# Patient Record
Sex: FEMALE | Race: WHITE | NOT HISPANIC OR LATINO | Employment: OTHER | URBAN - METROPOLITAN AREA
[De-identification: names, ages, dates, MRNs, and addresses within clinical notes are randomized per-mention and may not be internally consistent; named-entity substitution may affect disease eponyms.]

---

## 2018-10-02 ENCOUNTER — HISTORICAL (OUTPATIENT)
Dept: ANESTHESIOLOGY | Facility: HOSPITAL | Age: 83
End: 2018-10-02

## 2018-12-31 LAB — RAPID GROUP A STREP (OHS): NEGATIVE

## 2019-08-14 LAB
BILIRUB SERPL-MCNC: NEGATIVE MG/DL
BLOOD URINE, POC: NEGATIVE
CLARITY, POC UA: NORMAL
COLOR, POC UA: NORMAL
GLUCOSE UR QL STRIP: NEGATIVE
KETONES UR QL STRIP: NEGATIVE
LEUKOCYTE EST, POC UA: NORMAL
NITRITE, POC UA: POSITIVE
PH, POC UA: 6
PROTEIN, POC: NEGATIVE
SPECIFIC GRAVITY, POC UA: 1.01
UROBILINOGEN, POC UA: NORMAL

## 2019-08-15 ENCOUNTER — HISTORICAL (OUTPATIENT)
Dept: URGENT CARE | Facility: CLINIC | Age: 84
End: 2019-08-15

## 2021-01-11 ENCOUNTER — HISTORICAL (OUTPATIENT)
Dept: ADMINISTRATIVE | Facility: HOSPITAL | Age: 86
End: 2021-01-11

## 2021-05-11 ENCOUNTER — HISTORICAL (OUTPATIENT)
Dept: ADMINISTRATIVE | Facility: HOSPITAL | Age: 86
End: 2021-05-11

## 2021-05-11 LAB
ABS NEUT (OLG): 7.01 X10(3)/MCL (ref 2.1–9.2)
ALBUMIN SERPL-MCNC: 3.1 GM/DL (ref 3.4–4.8)
ALBUMIN/GLOB SERPL: 1.2 RATIO (ref 1.1–2)
ALP SERPL-CCNC: 58 UNIT/L (ref 40–150)
ALT SERPL-CCNC: 12 UNIT/L (ref 0–55)
AST SERPL-CCNC: 14 UNIT/L (ref 5–34)
BASOPHILS # BLD AUTO: 0 X10(3)/MCL (ref 0–0.2)
BASOPHILS NFR BLD AUTO: 0 %
BILIRUB SERPL-MCNC: 0.4 MG/DL
BILIRUBIN DIRECT+TOT PNL SERPL-MCNC: 0.2 MG/DL (ref 0–0.5)
BILIRUBIN DIRECT+TOT PNL SERPL-MCNC: 0.2 MG/DL (ref 0–0.8)
BUN SERPL-MCNC: 17.9 MG/DL (ref 9.8–20.1)
CALCIUM SERPL-MCNC: 9.1 MG/DL (ref 8.4–10.2)
CHLORIDE SERPL-SCNC: 114 MMOL/L (ref 98–111)
CHOLEST SERPL-MCNC: 127 MG/DL
CHOLEST/HDLC SERPL: 3 {RATIO} (ref 0–5)
CO2 SERPL-SCNC: 19 MMOL/L (ref 23–31)
CREAT SERPL-MCNC: 0.83 MG/DL (ref 0.55–1.02)
DEPRECATED CALCIDIOL+CALCIFEROL SERPL-MC: 27.6 NG/ML (ref 30–80)
EOSINOPHIL # BLD AUTO: 0.3 X10(3)/MCL (ref 0–0.9)
EOSINOPHIL NFR BLD AUTO: 3 %
ERYTHROCYTE [DISTWIDTH] IN BLOOD BY AUTOMATED COUNT: 15.1 % (ref 11.5–17)
EST. AVERAGE GLUCOSE BLD GHB EST-MCNC: 99.7 MG/DL
GLOBULIN SER-MCNC: 2.5 GM/DL (ref 2.4–3.5)
GLUCOSE SERPL-MCNC: 144 MG/DL (ref 75–121)
HBA1C MFR BLD: 5.1 %
HCT VFR BLD AUTO: 32.5 % (ref 37–47)
HDLC SERPL-MCNC: 39 MG/DL (ref 35–60)
HGB BLD-MCNC: 9.7 GM/DL (ref 12–16)
LDLC SERPL CALC-MCNC: 75 MG/DL (ref 50–140)
LYMPHOCYTES # BLD AUTO: 1.4 X10(3)/MCL (ref 0.6–4.6)
LYMPHOCYTES NFR BLD AUTO: 15 %
MAGNESIUM SERPL-MCNC: 1.9 MG/DL (ref 1.6–2.6)
MCH RBC QN AUTO: 28.5 PG (ref 27–31)
MCHC RBC AUTO-ENTMCNC: 29.8 GM/DL (ref 33–36)
MCV RBC AUTO: 95.6 FL (ref 80–94)
MONOCYTES # BLD AUTO: 0.7 X10(3)/MCL (ref 0.1–1.3)
MONOCYTES NFR BLD AUTO: 7 %
NEUTROPHILS # BLD AUTO: 7.01 X10(3)/MCL (ref 2.1–9.2)
NEUTROPHILS NFR BLD AUTO: 74 %
PLATELET # BLD AUTO: 300 X10(3)/MCL (ref 130–400)
PMV BLD AUTO: 9.9 FL (ref 9.4–12.4)
POTASSIUM SERPL-SCNC: 4.5 MMOL/L (ref 3.5–5.1)
PREALB SERPL-MCNC: 23.4 MG/DL (ref 14–37)
PROT SERPL-MCNC: 5.6 GM/DL (ref 5.8–7.6)
RBC # BLD AUTO: 3.4 X10(6)/MCL (ref 4.2–5.4)
SODIUM SERPL-SCNC: 144 MMOL/L (ref 132–146)
T4 FREE SERPL-MCNC: 0.96 NG/DL (ref 0.7–1.48)
TRIGL SERPL-MCNC: 63 MG/DL (ref 37–140)
TSH SERPL-ACNC: 2.25 UIU/ML (ref 0.35–4.94)
VLDLC SERPL CALC-MCNC: 13 MG/DL
WBC # SPEC AUTO: 9.5 X10(3)/MCL (ref 4.5–11.5)

## 2022-04-11 ENCOUNTER — HISTORICAL (OUTPATIENT)
Dept: ADMINISTRATIVE | Facility: HOSPITAL | Age: 87
End: 2022-04-11

## 2022-04-29 VITALS
OXYGEN SATURATION: 97 % | SYSTOLIC BLOOD PRESSURE: 140 MMHG | BODY MASS INDEX: 19.42 KG/M2 | HEIGHT: 64 IN | DIASTOLIC BLOOD PRESSURE: 63 MMHG | WEIGHT: 113.75 LBS

## 2022-09-22 ENCOUNTER — HISTORICAL (OUTPATIENT)
Dept: ADMINISTRATIVE | Facility: HOSPITAL | Age: 87
End: 2022-09-22

## 2022-10-03 ENCOUNTER — HOSPITAL ENCOUNTER (INPATIENT)
Facility: HOSPITAL | Age: 87
LOS: 3 days | Discharge: HOME | DRG: 312 | End: 2022-10-06
Attending: EMERGENCY MEDICINE | Admitting: INTERNAL MEDICINE
Payer: MEDICARE

## 2022-10-03 DIAGNOSIS — R07.9 CHEST PAIN: ICD-10-CM

## 2022-10-03 DIAGNOSIS — R53.81 PHYSICAL DECONDITIONING: ICD-10-CM

## 2022-10-03 DIAGNOSIS — R42 DIZZINESS: Primary | ICD-10-CM

## 2022-10-03 DIAGNOSIS — R00.1 BRADYCARDIA: ICD-10-CM

## 2022-10-03 DIAGNOSIS — N39.0 ACUTE UTI: ICD-10-CM

## 2022-10-03 DIAGNOSIS — Z78.9 INABILITY TO PERFORM ACTIVITIES OF DAILY LIVING: ICD-10-CM

## 2022-10-03 DIAGNOSIS — R53.1 WEAKNESS: ICD-10-CM

## 2022-10-03 LAB
ALBUMIN SERPL-MCNC: 3.7 GM/DL (ref 3.4–4.8)
ALBUMIN/GLOB SERPL: 1.1 RATIO (ref 1.1–2)
ALP SERPL-CCNC: 71 UNIT/L (ref 40–150)
ALT SERPL-CCNC: 9 UNIT/L (ref 0–55)
APPEARANCE UR: CLEAR
AST SERPL-CCNC: 13 UNIT/L (ref 5–34)
BACTERIA #/AREA URNS AUTO: ABNORMAL /HPF
BASOPHILS # BLD AUTO: 0.04 X10(3)/MCL (ref 0–0.2)
BASOPHILS NFR BLD AUTO: 0.5 %
BILIRUB UR QL STRIP.AUTO: NEGATIVE MG/DL
BILIRUBIN DIRECT+TOT PNL SERPL-MCNC: 0.3 MG/DL
BUN SERPL-MCNC: 18.7 MG/DL (ref 9.8–20.1)
CALCIUM SERPL-MCNC: 10.4 MG/DL (ref 8.4–10.2)
CHLORIDE SERPL-SCNC: 108 MMOL/L (ref 98–111)
CO2 SERPL-SCNC: 29 MMOL/L (ref 23–31)
COLOR UR AUTO: YELLOW
CREAT SERPL-MCNC: 0.67 MG/DL (ref 0.55–1.02)
EOSINOPHIL # BLD AUTO: 0.27 X10(3)/MCL (ref 0–0.9)
EOSINOPHIL NFR BLD AUTO: 3.4 %
ERYTHROCYTE [DISTWIDTH] IN BLOOD BY AUTOMATED COUNT: 14 % (ref 11.5–17)
GFR SERPLBLD CREATININE-BSD FMLA CKD-EPI: >60 MLS/MIN/1.73/M2
GLOBULIN SER-MCNC: 3.5 GM/DL (ref 2.4–3.5)
GLUCOSE SERPL-MCNC: 109 MG/DL (ref 75–121)
GLUCOSE UR QL STRIP.AUTO: NEGATIVE MG/DL
HCT VFR BLD AUTO: 35.9 % (ref 37–47)
HGB BLD-MCNC: 11.3 GM/DL (ref 12–16)
IMM GRANULOCYTES # BLD AUTO: 0.03 X10(3)/MCL (ref 0–0.04)
IMM GRANULOCYTES NFR BLD AUTO: 0.4 %
KETONES UR QL STRIP.AUTO: NEGATIVE MG/DL
LEUKOCYTE ESTERASE UR QL STRIP.AUTO: NEGATIVE UNIT/L
LYMPHOCYTES # BLD AUTO: 1.68 X10(3)/MCL (ref 0.6–4.6)
LYMPHOCYTES NFR BLD AUTO: 21.3 %
MCH RBC QN AUTO: 28.3 PG (ref 27–31)
MCHC RBC AUTO-ENTMCNC: 31.5 MG/DL (ref 33–36)
MCV RBC AUTO: 90 FL (ref 80–94)
MONOCYTES # BLD AUTO: 0.72 X10(3)/MCL (ref 0.1–1.3)
MONOCYTES NFR BLD AUTO: 9.1 %
NEUTROPHILS # BLD AUTO: 5.1 X10(3)/MCL (ref 2.1–9.2)
NEUTROPHILS NFR BLD AUTO: 65.3 %
NITRITE UR QL STRIP.AUTO: NEGATIVE
NRBC BLD AUTO-RTO: 0 %
PH UR STRIP.AUTO: 7.5 [PH]
PLATELET # BLD AUTO: 315 X10(3)/MCL (ref 130–400)
PMV BLD AUTO: 9 FL (ref 7.4–10.4)
POTASSIUM SERPL-SCNC: 4 MMOL/L (ref 3.5–5.1)
PROT SERPL-MCNC: 7.2 GM/DL (ref 5.8–7.6)
PROT UR QL STRIP.AUTO: NEGATIVE MG/DL
RBC # BLD AUTO: 3.99 X10(6)/MCL (ref 4.2–5.4)
RBC #/AREA URNS AUTO: <5 /HPF
RBC UR QL AUTO: NEGATIVE UNIT/L
SODIUM SERPL-SCNC: 141 MMOL/L (ref 132–146)
SP GR UR STRIP.AUTO: 1.01 (ref 1–1.03)
SQUAMOUS #/AREA URNS AUTO: <5 /HPF
TROPONIN I SERPL-MCNC: 0.01 NG/ML (ref 0–0.04)
UROBILINOGEN UR STRIP-ACNC: 0.2 MG/DL
WBC # SPEC AUTO: 7.9 X10(3)/MCL (ref 4.5–11.5)
WBC #/AREA URNS AUTO: <5 /HPF

## 2022-10-03 PROCEDURE — 21400001 HC TELEMETRY ROOM

## 2022-10-03 PROCEDURE — 93010 EKG 12-LEAD: ICD-10-PCS | Mod: ,,, | Performed by: STUDENT IN AN ORGANIZED HEALTH CARE EDUCATION/TRAINING PROGRAM

## 2022-10-03 PROCEDURE — 85025 COMPLETE CBC W/AUTO DIFF WBC: CPT | Performed by: EMERGENCY MEDICINE

## 2022-10-03 PROCEDURE — 25000003 PHARM REV CODE 250: Performed by: EMERGENCY MEDICINE

## 2022-10-03 PROCEDURE — 93005 ELECTROCARDIOGRAM TRACING: CPT

## 2022-10-03 PROCEDURE — 80053 COMPREHEN METABOLIC PANEL: CPT | Performed by: EMERGENCY MEDICINE

## 2022-10-03 PROCEDURE — 25000003 PHARM REV CODE 250: Performed by: NURSE PRACTITIONER

## 2022-10-03 PROCEDURE — 36415 COLL VENOUS BLD VENIPUNCTURE: CPT | Performed by: EMERGENCY MEDICINE

## 2022-10-03 PROCEDURE — 96374 THER/PROPH/DIAG INJ IV PUSH: CPT

## 2022-10-03 PROCEDURE — 84484 ASSAY OF TROPONIN QUANT: CPT | Performed by: EMERGENCY MEDICINE

## 2022-10-03 PROCEDURE — 93010 ELECTROCARDIOGRAM REPORT: CPT | Mod: 76,,, | Performed by: STUDENT IN AN ORGANIZED HEALTH CARE EDUCATION/TRAINING PROGRAM

## 2022-10-03 PROCEDURE — 81001 URINALYSIS AUTO W/SCOPE: CPT | Performed by: EMERGENCY MEDICINE

## 2022-10-03 PROCEDURE — 99285 EMERGENCY DEPT VISIT HI MDM: CPT | Mod: 25

## 2022-10-03 PROCEDURE — 63600175 PHARM REV CODE 636 W HCPCS: Performed by: EMERGENCY MEDICINE

## 2022-10-03 PROCEDURE — 93010 ELECTROCARDIOGRAM REPORT: CPT | Mod: ,,, | Performed by: STUDENT IN AN ORGANIZED HEALTH CARE EDUCATION/TRAINING PROGRAM

## 2022-10-03 RX ORDER — ACETAMINOPHEN 325 MG/1
650 TABLET ORAL EVERY 4 HOURS PRN
Status: DISCONTINUED | OUTPATIENT
Start: 2022-10-03 | End: 2022-10-06 | Stop reason: HOSPADM

## 2022-10-03 RX ORDER — RAMIPRIL 10 MG/1
10 CAPSULE ORAL 2 TIMES DAILY
Status: ON HOLD | COMMUNITY
Start: 2022-07-15 | End: 2022-10-06 | Stop reason: HOSPADM

## 2022-10-03 RX ORDER — SODIUM CHLORIDE 9 MG/ML
INJECTION, SOLUTION INTRAVENOUS CONTINUOUS
Status: DISCONTINUED | OUTPATIENT
Start: 2022-10-03 | End: 2022-10-06 | Stop reason: HOSPADM

## 2022-10-03 RX ORDER — BISACODYL 10 MG
10 SUPPOSITORY, RECTAL RECTAL DAILY PRN
Status: DISCONTINUED | OUTPATIENT
Start: 2022-10-03 | End: 2022-10-06 | Stop reason: HOSPADM

## 2022-10-03 RX ORDER — SODIUM CHLORIDE 0.9 % (FLUSH) 0.9 %
10 SYRINGE (ML) INJECTION
Status: DISCONTINUED | OUTPATIENT
Start: 2022-10-03 | End: 2022-10-06 | Stop reason: HOSPADM

## 2022-10-03 RX ORDER — LEVOTHYROXINE SODIUM 25 UG/1
25 TABLET ORAL DAILY
Status: DISCONTINUED | OUTPATIENT
Start: 2022-10-04 | End: 2022-10-06 | Stop reason: HOSPADM

## 2022-10-03 RX ORDER — METHENAMINE HIPPURATE 1000 MG/1
1 TABLET ORAL 2 TIMES DAILY
Status: ON HOLD | COMMUNITY
Start: 2022-07-29 | End: 2022-10-06 | Stop reason: HOSPADM

## 2022-10-03 RX ORDER — ENOXAPARIN SODIUM 100 MG/ML
40 INJECTION SUBCUTANEOUS EVERY 24 HOURS
Status: DISCONTINUED | OUTPATIENT
Start: 2022-10-04 | End: 2022-10-06 | Stop reason: HOSPADM

## 2022-10-03 RX ORDER — ONDANSETRON 2 MG/ML
4 INJECTION INTRAMUSCULAR; INTRAVENOUS EVERY 4 HOURS PRN
Status: DISCONTINUED | OUTPATIENT
Start: 2022-10-03 | End: 2022-10-06 | Stop reason: HOSPADM

## 2022-10-03 RX ORDER — TAMSULOSIN HYDROCHLORIDE 0.4 MG/1
1 CAPSULE ORAL DAILY
Status: ON HOLD | COMMUNITY
Start: 2022-09-28 | End: 2022-10-06 | Stop reason: HOSPADM

## 2022-10-03 RX ORDER — MECLIZINE HYDROCHLORIDE 25 MG/1
25 TABLET ORAL
Status: COMPLETED | OUTPATIENT
Start: 2022-10-03 | End: 2022-10-03

## 2022-10-03 RX ORDER — ALLOPURINOL 100 MG/1
100 TABLET ORAL DAILY
Status: ON HOLD | COMMUNITY
Start: 2022-06-24 | End: 2022-10-20 | Stop reason: SDUPTHER

## 2022-10-03 RX ORDER — AMLODIPINE BESYLATE 10 MG/1
10 TABLET ORAL DAILY
Status: ON HOLD | COMMUNITY
Start: 2022-07-29 | End: 2022-10-06 | Stop reason: HOSPADM

## 2022-10-03 RX ORDER — PROCHLORPERAZINE EDISYLATE 5 MG/ML
5 INJECTION INTRAMUSCULAR; INTRAVENOUS EVERY 6 HOURS PRN
Status: DISCONTINUED | OUTPATIENT
Start: 2022-10-03 | End: 2022-10-06 | Stop reason: HOSPADM

## 2022-10-03 RX ORDER — AMOXICILLIN 250 MG
1 CAPSULE ORAL 2 TIMES DAILY PRN
Status: DISCONTINUED | OUTPATIENT
Start: 2022-10-03 | End: 2022-10-06 | Stop reason: HOSPADM

## 2022-10-03 RX ORDER — TAMSULOSIN HYDROCHLORIDE 0.4 MG/1
1 CAPSULE ORAL DAILY
Status: DISCONTINUED | OUTPATIENT
Start: 2022-10-04 | End: 2022-10-06 | Stop reason: HOSPADM

## 2022-10-03 RX ORDER — METOPROLOL TARTRATE 25 MG/1
25 TABLET, FILM COATED ORAL 2 TIMES DAILY
Status: ON HOLD | COMMUNITY
Start: 2022-07-15 | End: 2022-10-06 | Stop reason: HOSPADM

## 2022-10-03 RX ORDER — PRAVASTATIN SODIUM 40 MG/1
40 TABLET ORAL DAILY
Status: ON HOLD | COMMUNITY
Start: 2022-09-22 | End: 2022-10-20 | Stop reason: SDUPTHER

## 2022-10-03 RX ORDER — MECLIZINE HYDROCHLORIDE 25 MG/1
25 TABLET ORAL 3 TIMES DAILY PRN
Status: DISCONTINUED | OUTPATIENT
Start: 2022-10-03 | End: 2022-10-06 | Stop reason: HOSPADM

## 2022-10-03 RX ORDER — HYDRALAZINE HYDROCHLORIDE 25 MG/1
25 TABLET, FILM COATED ORAL 2 TIMES DAILY
Status: ON HOLD | COMMUNITY
Start: 2022-08-16 | End: 2022-10-06 | Stop reason: HOSPADM

## 2022-10-03 RX ORDER — AMLODIPINE BESYLATE 5 MG/1
10 TABLET ORAL DAILY
Status: DISCONTINUED | OUTPATIENT
Start: 2022-10-04 | End: 2022-10-06 | Stop reason: HOSPADM

## 2022-10-03 RX ORDER — LEVOTHYROXINE SODIUM 25 UG/1
25 TABLET ORAL DAILY
Status: ON HOLD | COMMUNITY
Start: 2022-07-15 | End: 2022-10-20 | Stop reason: HOSPADM

## 2022-10-03 RX ORDER — PRAVASTATIN SODIUM 40 MG/1
40 TABLET ORAL DAILY
Status: DISCONTINUED | OUTPATIENT
Start: 2022-10-04 | End: 2022-10-06 | Stop reason: HOSPADM

## 2022-10-03 RX ORDER — ENOXAPARIN SODIUM 100 MG/ML
40 INJECTION SUBCUTANEOUS EVERY 24 HOURS
Status: DISCONTINUED | OUTPATIENT
Start: 2022-10-03 | End: 2022-10-03

## 2022-10-03 RX ORDER — MAG HYDROX/ALUMINUM HYD/SIMETH 200-200-20
30 SUSPENSION, ORAL (FINAL DOSE FORM) ORAL EVERY 4 HOURS PRN
Status: DISCONTINUED | OUTPATIENT
Start: 2022-10-03 | End: 2022-10-06 | Stop reason: HOSPADM

## 2022-10-03 RX ORDER — POLYETHYLENE GLYCOL 3350 17 G/17G
17 POWDER, FOR SOLUTION ORAL 2 TIMES DAILY PRN
Status: DISCONTINUED | OUTPATIENT
Start: 2022-10-03 | End: 2022-10-06 | Stop reason: HOSPADM

## 2022-10-03 RX ADMIN — CEFTRIAXONE SODIUM 1 G: 1 INJECTION, POWDER, FOR SOLUTION INTRAMUSCULAR; INTRAVENOUS at 05:10

## 2022-10-03 RX ADMIN — SODIUM CHLORIDE: 9 INJECTION, SOLUTION INTRAVENOUS at 11:10

## 2022-10-03 RX ADMIN — MECLIZINE HYDROCHLORIDE 25 MG: 25 TABLET ORAL at 02:10

## 2022-10-03 NOTE — ED PROVIDER NOTES
Encounter Date: 10/3/2022       History     Chief Complaint   Patient presents with    Weakness     Patient reports weakness since last night, denies chest pain or SOB, also reports frequent urination     89-year-old female presenting with dizziness since last night when she woke up to go to the bathroom.  States that for the past 2 weeks she has been feeling dizziness mostly at nighttime.  But today it worsened.  Relative at bedside reports that she was so dizzy she almost fell today.  Patient reports that she feels like she is spinning and off-balance.    The history is provided by the patient and a relative. No  was used.   Neurologic Problem  The primary symptoms include dizziness. Primary symptoms do not include seizures, fever, nausea or vomiting. The symptoms began yesterday. The symptoms are unchanged.   She describes the dizziness as sensation of spinning and imbalance. Dizziness also occurs with weakness. Dizziness does not occur with nausea or vomiting.   Additional symptoms include weakness.   Review of patient's allergies indicates:  No Known Allergies  Past Medical History:   Diagnosis Date    Coronary artery disease     Gout, unspecified     Hypertension     Thyroid disease      Past Surgical History:   Procedure Laterality Date    CORONARY ANGIOPLASTY WITH STENT PLACEMENT       History reviewed. No pertinent family history.  Social History     Tobacco Use    Smoking status: Never    Smokeless tobacco: Never   Substance Use Topics    Alcohol use: Never     Review of Systems   Constitutional:  Negative for chills and fever.   HENT:  Negative for congestion and sore throat.    Eyes:  Negative for pain and visual disturbance.   Respiratory:  Negative for cough and shortness of breath.    Cardiovascular:  Negative for chest pain and leg swelling.   Gastrointestinal:  Negative for abdominal pain, diarrhea, nausea and vomiting.   Genitourinary:  Negative for dysuria and menstrual  problem.   Skin:  Negative for rash and wound.   Allergic/Immunologic: Negative for food allergies and immunocompromised state.   Neurological:  Positive for dizziness and weakness. Negative for seizures and syncope.     Physical Exam     Initial Vitals [10/03/22 1332]   BP Pulse Resp Temp SpO2   (!) 144/50 76 16 97.9 °F (36.6 °C) 97 %      MAP       --         Physical Exam    Nursing note and vitals reviewed.  Constitutional: She appears well-developed and well-nourished. She is not diaphoretic. She does not appear ill. No distress.   Hard of hearing, thin elderly white female    HENT:   Head: Normocephalic and atraumatic.   Right Ear: External ear normal.   Left Ear: External ear normal.   Mouth/Throat: Oropharynx is clear and moist.   Eyes: Conjunctivae and EOM are normal. Pupils are equal, round, and reactive to light.   Neck: Neck supple. No tracheal deviation present.   Cardiovascular:  Normal rate, regular rhythm, normal heart sounds and intact distal pulses.           No murmur heard.  Pulmonary/Chest: Breath sounds normal. No respiratory distress. She has no wheezes. She has no rhonchi. She has no rales.   Abdominal: Abdomen is soft. Bowel sounds are normal. She exhibits no distension. There is no abdominal tenderness.   No right CVA tenderness.  No left CVA tenderness.   Musculoskeletal:         General: Normal range of motion.      Cervical back: Neck supple.     Neurological: She is alert and oriented to person, place, and time. She has normal strength. No cranial nerve deficit or sensory deficit. GCS score is 15. GCS eye subscore is 4. GCS verbal subscore is 5. GCS motor subscore is 6.   Dysmetria with finger to nose on right    Skin: Skin is warm and dry. Capillary refill takes less than 2 seconds. No rash noted. No pallor.   Psychiatric: She has a normal mood and affect. Her behavior is normal.       ED Course   Procedures  Labs Reviewed   COMPREHENSIVE METABOLIC PANEL - Abnormal; Notable for the  following components:       Result Value    Calcium Level Total 10.4 (*)     All other components within normal limits   CBC WITH DIFFERENTIAL - Abnormal; Notable for the following components:    RBC 3.99 (*)     Hgb 11.3 (*)     Hct 35.9 (*)     MCHC 31.5 (*)     All other components within normal limits   URINALYSIS, MICROSCOPIC - Abnormal; Notable for the following components:    Bacteria, UA 1+ (*)     All other components within normal limits   TROPONIN I - Normal   URINALYSIS, REFLEX TO URINE CULTURE - Normal   CBC W/ AUTO DIFFERENTIAL    Narrative:     The following orders were created for panel order CBC auto differential.  Procedure                               Abnormality         Status                     ---------                               -----------         ------                     CBC with Differential[[id number]]        Abnormal            Final result                 Please view results for these tests on the individual orders.     EKG Readings: (Independently Interpreted)   Initial Reading: No STEMI. Rhythm: Normal Sinus Rhythm. Heart Rate: 64. Axis: Left Axis Deviation. Clinical Impression: Left Ventricular Hypertrophy (LDH)   Performed at 1418   ECG Results              EKG 12-lead (Final result)  Result time 10/04/22 04:49:41      Final result by Interface, Lab In [facility] (10/04/22 04:49:41)                   Narrative:    Test Reason : R00.1,    Vent. Rate : 070 BPM     Atrial Rate : 070 BPM     P-R Int : 162 ms          QRS Dur : 130 ms      QT Int : 456 ms       P-R-T Axes : 049 -58 070 degrees     QTc Int : 492 ms    Sinus rhythm with occasional Premature ventricular complexes  Left axis deviation  Left bundle branch block  Abnormal ECG  Confirmed by Ham Arthur MD ([id number]) on 10/4/2022 4:49:34 AM    Referred By: [email or web address]   SELF           Confirmed By:Ham Arthur MD                                     EKG 12-lead (Final result)  Result time 10/04/22 05:14:43      Final result by  Interface, Lab In [facility] (10/04/22 05:14:43)                   Narrative:    Test Reason : R53.1,    Vent. Rate : 064 BPM     Atrial Rate : 064 BPM     P-R Int : 156 ms          QRS Dur : 132 ms      QT Int : 470 ms       P-R-T Axes : 075 -44 048 degrees     QTc Int : 484 ms    Normal sinus rhythm  Left axis deviation  Nonspecific intraventricular block  Minimal voltage criteria for LVH, may be normal variant ( Osiel product )  Abnormal ECG  No previous ECGs available  Confirmed by Ham Arthur MD ([id number]) on 10/4/2022 5:14:30 AM    Referred By:             Confirmed By:Ham Arthur MD                                  Imaging Results              CT Head Without Contrast (Final result)  Result time 10/03/22 15:10:11      Final result by Jaydon Morrow MD (10/03/22 15:10:11)                   Impression:      Chronic age-related changes.  No acute process      Electronically signed by: Jaydon Morrow  Date:    10/03/2022  Time:    15:10               Narrative:    EXAMINATION:  CT HEAD WITHOUT CONTRAST    CLINICAL HISTORY:  Dizziness, persistent/recurrent, cardiac or vascular cause suspected;    TECHNIQUE:  Multiple axial images were obtained from the base of the brain to the vertex without contrast administration.  Sagittal and coronal reconstructions were performed..Automatic exposure control is utilized to reduce patient radiation exposure.    COMPARISON:  None    FINDINGS:  There is no intracranial mass or lesion seen.  No hemorrhage is seen.  No acute infarct is seen.  There is some cerebral atrophy seen.  There is some compensatory ventricular dilatation and periventricular white matter changes consistent with the patient's age.  Calvarium is intact.  The posterior fossa is intact.  The visualized portions of the paranasal sinuses show no acute abnormality.                                    X-Rays:   Independently Interpreted Readings:   Head CT: No hemorrhage.   Medications   meclizine tablet  25 mg (25 mg Oral Given 10/3/22 1415)   cefTRIAXone (ROCEPHIN) 1 g in dextrose 5 % in water (D5W) 5 % 50 mL IVPB (MB+) (0 g Intravenous Stopped 10/3/22 1730)     Medical Decision Making:   Initial Assessment:   89 yo female well appearing, mild dysmetria with finger to nose on right   Clinical Tests:   Lab Tests: Ordered and Reviewed  The following lab test(s) were unremarkable: Troponin       <> Summary of Lab: Bacteria in urine, hypercalcemia, anemia   Radiological Study: Ordered and Reviewed  Medical Tests: Ordered and Reviewed  ED Management:  Given Meclizine for dizziness  Labs with mild UTI- given Rocephin  CT head with age related changes  Admitted to hospitalist for further stroke workup and PT eval   Other:   I have discussed this case with another health care provider.           ED Course as of 10/04/22 1007   Mon Oct 03, 2022   1646 Discussed results with patient and son.  Will admit due to deconditioning and fall risk [KM]   1648 Paged hospitalist [MM]      ED Course User Index  [KM] Tami Marin MD  [MM] Deirdre Madrid                 Clinical Impression:   Final diagnoses:  [R53.1] Weakness  [R42] Dizziness (Primary)  [N39.0] Acute UTI  [R53.81] Physical deconditioning  [Z78.9] Inability to perform activities of daily living        ED Disposition Condition    Admit Stable                Tami Marin MD  10/04/22 1011

## 2022-10-04 PROCEDURE — 97166 OT EVAL MOD COMPLEX 45 MIN: CPT

## 2022-10-04 PROCEDURE — 63600175 PHARM REV CODE 636 W HCPCS: Performed by: NURSE PRACTITIONER

## 2022-10-04 PROCEDURE — 63600175 PHARM REV CODE 636 W HCPCS: Performed by: STUDENT IN AN ORGANIZED HEALTH CARE EDUCATION/TRAINING PROGRAM

## 2022-10-04 PROCEDURE — 21400001 HC TELEMETRY ROOM

## 2022-10-04 PROCEDURE — 97162 PT EVAL MOD COMPLEX 30 MIN: CPT

## 2022-10-04 PROCEDURE — 25000003 PHARM REV CODE 250: Performed by: NURSE PRACTITIONER

## 2022-10-04 PROCEDURE — 25000003 PHARM REV CODE 250: Performed by: STUDENT IN AN ORGANIZED HEALTH CARE EDUCATION/TRAINING PROGRAM

## 2022-10-04 RX ADMIN — AMLODIPINE BESYLATE 10 MG: 5 TABLET ORAL at 09:10

## 2022-10-04 RX ADMIN — CEFTRIAXONE SODIUM 1 G: 1 INJECTION, POWDER, FOR SOLUTION INTRAMUSCULAR; INTRAVENOUS at 02:10

## 2022-10-04 RX ADMIN — LEVOTHYROXINE SODIUM 25 MCG: 25 TABLET ORAL at 09:10

## 2022-10-04 RX ADMIN — TAMSULOSIN HYDROCHLORIDE 0.4 MG: 0.4 CAPSULE ORAL at 09:10

## 2022-10-04 RX ADMIN — ENOXAPARIN SODIUM 40 MG: 40 INJECTION SUBCUTANEOUS at 05:10

## 2022-10-04 RX ADMIN — PRAVASTATIN SODIUM 40 MG: 40 TABLET ORAL at 09:10

## 2022-10-04 NOTE — PT/OT/SLP EVAL
Physical Therapy Evaluation    Patient Name:  Heidy Gay   MRN:  [id number]    Recommendations:     Discharge Recommendations:   (pending progress and medical status)   Discharge Equipment Recommendations: walker, rolling   Barriers to discharge:  acuity of illness    Assessment:     Heidy Gay is a 89 y.o. female admitted with a medical diagnosis of Dizziness.  She presents with the following impairments/functional limitations:  weakness, impaired endurance, impaired self care skills, impaired functional mobility, gait instability.    Rehab Prognosis: Good; patient would benefit from acute skilled PT services to address these deficits and reach maximum level of function.    Recent Surgery: * No surgery found *      Plan:     During this hospitalization, patient to be seen daily to address the identified rehab impairments via gait training, therapeutic activities, therapeutic exercises, neuromuscular re-education and progress toward the following goals:    Plan of Care Expires:  22    Subjective     Chief Complaint: dizziness when sitting up and standing  Patient/Family Comments/goals: to be independent  Pain/Comfort:  Pain Rating 1: 0/10    Patients cultural, spiritual, [employer] conflicts given the current situation: no    Living Environment:  Pt lives alone in a SLH with no steps to enter.   Prior to admission, patients level of function was independent.  Equipment used at home: walker, rolling.  DME owned (not currently used): single point cane.  Upon discharge, patient will have assistance from family.    Objective:     Communicated with RN prior to session.  Patient found HOB elevated with peripheral IV, PureWick  upon PT entry to room.    General Precautions: Standard, fall   Orthopedic Precautions:    Braces:    Respiratory Status: Room air    BP in supine: 144/66 mmHg  BP sitting EOB: 117/64 mmHg  BP standin/52 mmHg    Exams:  Cognitive Exam:  Patient is oriented to Person, Place,  Time, and Situation  RLE ROM: WFL  RLE Strength: WFL  LLE ROM: WFL  LLE Strength: WFL    Functional Mobility:  Bed Mobility:     Supine to Sit: supervision  Sit to Supine: supervision  Transfers:     Sit to Stand:  contact guard assistance with rolling walker  Gait: pt took two steps with RW and Tong. Pt c/o dizziness so no further OOB mobility was attempted and vitals were taken (see above).       AM-PAC 6 CLICK MOBILITY  Total Score:19     Patient left HOB elevated with all lines intact, call button in reach, and RN notified.    GOALS:   Multidisciplinary Problems       Physical Therapy Goals          Problem: Physical Therapy    Goal Priority Disciplines Outcome Goal Variances Interventions   Physical Therapy Goal     PT, PT/OT      Description: Goals to be met by: 11/3/2022     Patient will increase functional independence with mobility by performin. Supine to sit with [place]  2. Sit to supine with [place]  3. Sit to stand transfer with Modified [place]  4. Gait  x 500 feet with Modified [place] using Rolling Walker vs LRAD.                          History:     Past Medical History:   Diagnosis Date    Coronary artery disease     Gout, unspecified     Hypertension     Thyroid disease        Past Surgical History:   Procedure Laterality Date    CORONARY ANGIOPLASTY WITH STENT PLACEMENT         Time Tracking:     PT Received On: 10/04/22  PT Start Time: 1319     PT Stop Time: 1336  PT Total Time (min): 17 min     Billable Minutes: Evaluation , moderate complexity       10/04/2022

## 2022-10-04 NOTE — PLAN OF CARE
Problem: Occupational Therapy  Goal: Occupational Therapy Goal  Description: STG: to be met in 2 weeks  1. UB dressing independently.  2. LB dressing with RW Mod I.  3. Toileting, toilet t/f, functional mobility to toilet with RW mod I.  4. Grooming standing at sink with RW mod I.  5. Shower t/f mod I with RW and SC.    Outcome: Ongoing, Progressing

## 2022-10-04 NOTE — PLAN OF CARE
Goals to be met by: 11/3/2022     Patient will increase functional independence with mobility by performin. Supine to sit with [place]  2. Sit to supine with Will  3. Sit to stand transfer with Modified [place]  4. Gait  x 500 feet with Modified [place] using Rolling Walker vs LRAD.

## 2022-10-04 NOTE — PROGRESS NOTES
"[facility]  Hospital Medicine Progress Note        Chief Complaint: seeing black spots on walls.    HPI:   89 yo female with pmhx HTN, CAD/stent, hypothyroidism.    presents to the ED with c/o seeing "black spots" on her bedroom wall at night for the past two weeks. She states that she goes to bed every night at 7:00 but reads before she falls asleep.  She states that when she was reading in bed when she would look up she would see a floating black spot on the wall in front of her.  She is unsure how long it last but states eventually I would  fall asleep and when I would wake up it was not longer there. However, for the past 2 nights she noticed the spots around 7:00 p.m. and then again around midnight when she would wake up and go to the bathroom, this time she reports feeling off balance" when she went to the bathroom.  She did not fall, no head trauma or loss of consciousness.  She denies nausea, vomiting, headache ,fever, chills, blurred vision, unilateral weakness, history of TIA or CVA, medication changes. She does not take a sleep aid.  The symptoms only occur at nighttime.        Initial ED VS:  Temperature 97.9°, HR 76, RR 18, /50.  EKG NSR with nonspecific intraventricular block Labs remarkable for hemoglobin 11.3, hematocrit 35.9, calcium 10.4, UA 1+ bacteria.  Patient without urinary complaints.  She does report for the past 3 years she has to go to the bathroom every 2 hours and this is nothing new.  CT of her head with age related changes.  No acute findings.  She is being admitted to the hospitalist service for further management     Interval Hx:     Patient wants complains burning with urination and significantly foul smell, present in the room during my evaluation.  She is no longer seeing black spots  Objective/physical exam:    Neuro:  Alert awake oriented, comprehension intact, comprehension intact.  She is hard of hearing.    General: Appears comfortable, no " acute distress.  Integumentary: Warm, dry, intact.  Musculoskeletal: Purposeful movement noted.   Respiratory: No accessory muscle use. Breath sounds are equal.  Cardiovascular: Regular rate. No peripheral edema.    VITAL SIGNS: 24 HRS MIN & MAX LAST   Temp  Min: 97.4 °F (36.3 °C)  Max: 98.1 °F (36.7 °C) 97.4 °F (36.3 °C)   BP  Min: 115/62  Max: 144/50 120/66   Pulse  Min: 54  Max: 89  89   Resp  Min: 16  Max: 22 (!) 22     SpO2  Min: 94 %  Max: 98 % 96 %     CT Head Without Contrast  Narrative: EXAMINATION:  CT HEAD WITHOUT CONTRAST    CLINICAL HISTORY:  Dizziness, persistent/recurrent, cardiac or vascular cause suspected;    TECHNIQUE:  Multiple axial images were obtained from the base of the brain to the vertex without contrast administration.  Sagittal and coronal reconstructions were performed..Automatic exposure control is utilized to reduce patient radiation exposure.    COMPARISON:  None    FINDINGS:  There is no intracranial mass or lesion seen.  No hemorrhage is seen.  No acute infarct is seen.  There is some cerebral atrophy seen.  There is some compensatory ventricular dilatation and periventricular white matter changes consistent with the patient's age.  Calvarium is intact.  The posterior fossa is intact.  The visualized portions of the paranasal sinuses show no acute abnormality.  Impression: Chronic age-related changes.  No acute process    Electronically signed by: Jaydon Morrow  Date:    10/03/2022  Time:    15:10    Recent Labs   Lab 10/03/22  1355   WBC 7.9   RBC 3.99*   HGB 11.3*   HCT 35.9*   MCV 90.0   MCH 28.3   MCHC 31.5*   RDW 14.0      MPV 9.0       Recent Labs   Lab 10/03/22  1355      K 4.0   CO2 29   BUN 18.7   CREATININE 0.67   CALCIUM 10.4*   ALBUMIN 3.7   ALKPHOS 71   ALT 9   AST 13   BILITOT 0.3          Microbiology Results (last 7 days)       ** No results found for the last 168 hours. **             See below for Radiology    Scheduled Med:   amLODIPine  10 mg  Oral Daily    enoxaparin  40 mg Subcutaneous Daily    levothyroxine  25 mcg Oral Daily    pravastatin  40 mg Oral Daily    tamsulosin  1 capsule Oral Daily        Continuous Infusions:   sodium chloride 0.9% 75 mL/hr at 10/03/22 8977        PRN Meds:  acetaminophen, aluminum-magnesium hydroxide-simethicone, bisacodyL, meclizine, ondansetron, polyethylene glycol, prochlorperazine, senna-docusate 8.6-50 mg, sodium chloride 0.9%, trazodone     Nutrition Status:      Assessment/Plan:  Visual Hallucination   Gait Ataxia  Bradycardia  Asymptomatic Bacteruria  Essential hypertension  Hyperlipidemia  CAD -remote history stent placement  Hypothyroidism       Patient presents for visual hallucinations, neurology has been consulted, CT of the head is unremarkable.  MRI of the brain is pending.    Labs are significantly unremarkable.    Patient was symptomatic urinary complaints, will add Rocephin monitor clinically for improvement and as such time transition to oral antibiotic for total 3-5 days  Agree with melatonin for insomnia.    Continue telemetry.    Consult PT/OT.      Anticipated discharge and Disposition:  Pending    All diagnosis and differential diagnosis have been reviewed; assessment and plan has been documented; I have personally reviewed the labs and test results that are presently available; I have reviewed the patients medication list; I have reviewed the consulting providers response and recommendations. I have reviewed or attempted to review medical records based upon their availability    All of the patient's questions have been  addressed and answered. Patient's is agreeable to the above stated plan.   I will continue to monitor closely and make adjustments to medical management as needed.          Glo Meneses,    10/04/2022        This note was created with the assistance of Dragon voice recognition software. There may be transcription errors as a result of using this technology however minimal.  Effort has been made to assure accuracy of transcription but any obvious errors or omissions should be clarified with the author of the document.

## 2022-10-04 NOTE — PT/OT/SLP EVAL
Occupational Therapy   Evaluation    Name: Heiyd Gay  MRN: [id number]  Admitting Diagnosis:  Dizziness  Recent Surgery: * No surgery found *      Recommendations:     Discharge Recommendations: rehabilitation facility (or home with HH if family is able to stay with her for 1-2 weeks)  Discharge Equipment Recommendations:  walker, rolling  Barriers to discharge:   (lives alone; fall risk)    Assessment:     Heidy Gay is a 89 y.o. female with a medical diagnosis of Dizziness, ataxia, floaters in vision at night.  She presents with generalized deconditioning and instability during ADL/mobility resulting in increased fall risk. Performance deficits affecting function: weakness, impaired endurance, impaired self care skills, impaired functional mobility, impaired balance.  She would benefit from short rehab stay vs home with HH and 1-2 weeks of close family supervision on d/c for fall prevention.    Rehab Prognosis: Good; patient would benefit from acute skilled OT services to address these deficits and reach maximum level of function.       Plan:     Patient to be seen 5 x/week to address the above listed problems via self-care/home management, therapeutic activities, therapeutic exercises  Plan of Care Expires: 10/18/22  Plan of Care Reviewed with: patient    Subjective     Chief Complaint: none  Patient/Family Comments/goals: wants to go home    Occupational Profile:  Living Environment: pt lives alone (states that her sons live nearby) in a 2 story home with 1 step to enter, bedroom/bath on first floor, walk in shower.  Previous level of function: independent  Roles and Routines: performs own ADL, has assist with strenuous IADL. son assists with medication management  Equipment Used at Home:  shower chair (pt uses a cane for outdoor mobility; has a rollator but does not use it)  Assistance upon Discharge: sons nearby to check on occasionally     Pain/Comfort:  Pain Rating 1: 0/10    Patients cultural,  spiritual, Congregational conflicts given the current situation: no    Objective:     Communicated with: CM upon conclusion.  Patient found HOB elevated with PureWick, telemetry upon OT entry to room.    General Precautions: Standard, fall   Orthopedic Precautions:N/A   Braces: N/A  Respiratory Status: Room air    Occupational Performance:    Bed Mobility:    Patient completed Supine to Sit with contact guard assistance  Patient completed Sit to Supine with contact guard assistance    Functional Mobility/Transfers:  Patient completed Sit <> Stand Transfer with contact guard assistance  with  hand-held assist   Patient completed Toilet Transfer Step Transfer technique with minimum assistance with  hand-held assist  Functional Mobility: CGA with RW    Activities of Daily Living:  Upper Body Dressing: minimum assistance .  Lower Body Dressing: minimum assistance .    Cognitive/Visual Perceptual:  Cognitive/Psychosocial Skills:     -       Oriented to: Person and Place   -       Follows Commands/attention:Follows one-step commands  -       Mood/Affect/Coping skills/emotional control: Cooperative  Visual/Perceptual:      -appears WFL;  pt reports that floaters in vision have resolved    Physical Exam:  Sit balance: SBA  Stand balance: CGA    BUE: AROM WFL, strength 4/5    AMPAC 6 Click ADL:  AMPAC Total Score: 20    Treatment & Education:  Initial eval performed. Pt educated on OT role/goals/POC/discharge recs.    Patient left HOB elevated with all lines intact    GOALS:   Multidisciplinary Problems       Occupational Therapy Goals          Problem: Occupational Therapy    Goal Priority Disciplines Outcome Interventions   Occupational Therapy Goal     OT, PT/OT Ongoing, Progressing    Description: STG: to be met in 2 weeks  1. UB dressing independently.  2. LB dressing with RW Mod I.  3. Toileting, toilet t/f, functional mobility to toilet with RW mod I.  4. Grooming standing at sink with RW mod I.  5. Shower t/f mod I with  RW and SC.                         History:     Past Medical History:   Diagnosis Date    Coronary artery disease     Gout, unspecified     Hypertension     Thyroid disease          Past Surgical History:   Procedure Laterality Date    CORONARY ANGIOPLASTY WITH STENT PLACEMENT         Time Tracking:     OT Date of Treatment: 10/04/22  OT Start Time: 1112  OT Stop Time: 1125  OT Total Time (min): 13 min    Billable Minutes:Evaluation moderate    10/4/2022

## 2022-10-04 NOTE — H&P
"[facility] Hospital Medicine   History & Physical Note      Patient Name: Heidy Gay  : 1929  MRN: [id number]  PCP: Jay Arroyo MD  Admitting Service: Hospital Medicine  Attending Physician: Becki Palma MD  Admission Date: 10/3/2022 - IP- Inpatient   Length of Stay: 0  History source: EMR, patient and/or patient's family  Face-to-Face encounter date: 10/03/2022  Code status: Full    Chief Complaint   "Seeing black spots on my walls"      History of Present Illness   Mrs. Gay is a 89 yo female with pmhx HTN, CAD/stent, hypothyroidism who presents to the ED with c/o seeing "black spots" on her bedroom wall at night for the past two weeks. She states that she goes to bed every night at 7:00 a.m. but reads before she falls asleep.  She states that when she was reading in bed when she would look up she would see a floating black spot on the wall in front of her.  She is unsure how long it lets but states eventually I would close fall asleep and when I woud wake up it was not longer there. However, for the past 2 nights she noticed the spots around 7:00 p.m. and then again around midnight when she would wake up and go to the bathroom, this time she reports feeling off balance" when she went to the bathroom.  She did not fall, no head trauma or loss of consciousness.  She denies nausea, vomiting, headache ,fever, chills, blurred vision, unilateral weakness, history of TIA or CVA, medication changes. She does not take a sleep aid.  The symptoms only occur at nighttime.      Initial ED VS:  Temperature 97.9°, HR 76, RR 18, /50.  EKG NSR with nonspecific intraventricular block Labs remarkable for hemoglobin 11.3, hematocrit 35.9, calcium 10.4, UA 1+ bacteria.  Patient without urinary complaints.  She does report for the past 3 years she has to go to the bathroom every 2 hours and this is nothing new.  CT of her head with age related changes.  No acute findings.  She " is being admitted to the hospitalist service for further management      ROS   Except as documented, all other systems reviewed and negative     Past Medical History   Essential hypertension   Hyperlipidemia   CAD  Hypothyroidism  Past Surgical History     Past Surgical History:   Procedure Laterality Date    CORONARY ANGIOPLASTY WITH STENT PLACEMENT         Social History     Social History     Tobacco Use    Smoking status: Never    Smokeless tobacco: Never   Substance Use Topics    Alcohol use: Never        Family History   Reviewed and negative    Allergies   Patient has no known allergies.    Home Medications     Prior to Admission medications    Medication Sig Start Date End Date Taking? Authorizing Provider   allopurinoL (ZYLOPRIM) 100 MG tablet Take 100 mg by mouth once daily. 6/24/22   Historical Provider   amLODIPine (NORVASC) 10 MG tablet Take 10 mg by mouth once daily. 7/29/22   Historical Provider   hydrALAZINE (APRESOLINE) 25 MG tablet Take 25 mg by mouth 2 (two) times daily. 8/16/22   Historical Provider   levothyroxine (SYNTHROID) 25 MCG tablet Take 25 mcg by mouth once daily. 7/15/22   Historical Provider   methenamine (HIPREX) 1 gram Tab Take 1 g by mouth 2 (two) times daily. 7/29/22   Historical Provider   metoprolol tartrate (LOPRESSOR) 25 MG tablet Take 25 mg by mouth 2 (two) times daily. 7/15/22   Historical Provider   pravastatin (PRAVACHOL) 40 MG tablet Take 40 mg by mouth once daily. 9/22/22   Historical Provider   ramipriL (ALTACE) 10 MG capsule Take 10 mg by mouth 2 (two) times a day. 7/15/22   Historical Provider   tamsulosin (FLOMAX) 0.4 mg Cap Take 1 capsule by mouth once daily. 9/28/22   Historical Provider        Inpatient Medications   Scheduled Meds    Continuous Infusions   sodium chloride 0.9%       PRN Meds      Physical Exam   Vital Signs  Temp:  [97.9 °F (36.6 °C)]   Pulse:  [54-83]   Resp:  [16-21]   BP: (121-144)/(48-80)   SpO2:  [96 %-98 %]    General: Appears  comfortable  HEENT: NC/AT  Neck:  No JVD  Chest: CTABL  CVS: Regular rhythm. Normal S1/S2.  Abdomen: nondistended, normoactive BS, soft and non-tender.  MSK: No obvious deformity or joint swelling  Skin: Warm and dry  Neuro: AAOx3, no focal neurological deficit. Motor strength 5/5 BUE, finger to nose test abnormal on left, gait not assessed. Motor strength 3/4 RLE (Chronic per pt) 5/5 LLE.   Psych: Cooperative    Labs     Recent Labs     10/03/22  1355   WBC 7.9   RBC 3.99*   HGB 11.3*   HCT 35.9*   MCV 90.0   MCH 28.3   MCHC 31.5*   RDW 14.0        No results for input(s): LACTIC in the last 72 hours.  No results for input(s): INR, APTT, D-DIMER in the last 72 hours.  No results for input(s): HGBA1C, CHOL, TRIG, LDL, VLDL, HDL in the last 72 hours.   Recent Labs     10/03/22  1355      K 4.0   CHLORIDE 108   CO2 29   BUN 18.7   CREATININE 0.67   GLUCOSE 109   CALCIUM 10.4*   ALBUMIN 3.7   GLOBULIN 3.5   ALKPHOS 71   ALT 9   AST 13   BILITOT 0.3     Recent Labs     10/03/22  1355   TROPONINI 0.013          Microbiology Results (last 7 days)       ** No results found for the last 168 hours. **           Imaging     CT Head Without Contrast   Final Result      Chronic age-related changes.  No acute process         Electronically signed by: Jaydon Morrow   Date:    10/03/2022   Time:    15:10      MRI Brain Without Contrast    (Results Pending)     Assessment & Plan   Visual Hallucinations, at night only  Gait Ataxia  Insomnia  Bradycardia  Asymptomatic Bacteruria  Essential HTN  Hypothyroidism        Plan:  - MRI Brain w/o ordered. Consider Neurlogy consult pending results.   - patient currently without symptoms, she was given 1 dose of Rocephin in the ED, will hold off on further antibiotics and follow cultures.  - Telemetry. HOLD BB for now. STAT EKG  - Melatonin PRN insomnia  - CBC, CMP in AM    I, Debra Maldonado, KARINAPNiaC have discussed this patients case with Dr. Arroyo who agrees with the diagnosis  and treatment plan.      ____________________________________________________________________________  I, Dr. Jonathan Arroyo assumed care of this patient.  For the patient encounter, I performed the substantive portion of the visit, I reviewed the NPPA documentation, treatment plan, and medical decision making.  I had face to face time with this patient.  I have personally reviewed the labs and test results that are presently available. I have reviewed or attempted to review medical records based upon their availability. If patient was admitted under observational status it is with my approval.      Very pleasant 89-year-old female seeing dark spots moving on the wall only at night and only briefly.  She also reportedly had some difficulty with gait yesterday but these symptoms resolved.  On exam she has no appreciable focal neurologic deficits.  Agree with MRI of the brain.    Time seen: 11PM   Jonathan Arroyo MD

## 2022-10-05 PROCEDURE — 97530 THERAPEUTIC ACTIVITIES: CPT

## 2022-10-05 PROCEDURE — 63600175 PHARM REV CODE 636 W HCPCS: Performed by: STUDENT IN AN ORGANIZED HEALTH CARE EDUCATION/TRAINING PROGRAM

## 2022-10-05 PROCEDURE — 25000003 PHARM REV CODE 250: Performed by: NURSE PRACTITIONER

## 2022-10-05 PROCEDURE — 21400001 HC TELEMETRY ROOM

## 2022-10-05 PROCEDURE — 97535 SELF CARE MNGMENT TRAINING: CPT

## 2022-10-05 PROCEDURE — 63600175 PHARM REV CODE 636 W HCPCS: Performed by: NURSE PRACTITIONER

## 2022-10-05 PROCEDURE — 25000003 PHARM REV CODE 250: Performed by: STUDENT IN AN ORGANIZED HEALTH CARE EDUCATION/TRAINING PROGRAM

## 2022-10-05 RX ORDER — TRAMADOL HYDROCHLORIDE 50 MG/1
50 TABLET ORAL EVERY 4 HOURS PRN
Status: DISCONTINUED | OUTPATIENT
Start: 2022-10-05 | End: 2022-10-06 | Stop reason: HOSPADM

## 2022-10-05 RX ADMIN — CEFTRIAXONE SODIUM 1 G: 1 INJECTION, POWDER, FOR SOLUTION INTRAMUSCULAR; INTRAVENOUS at 11:10

## 2022-10-05 RX ADMIN — PRAVASTATIN SODIUM 40 MG: 40 TABLET ORAL at 08:10

## 2022-10-05 RX ADMIN — ENOXAPARIN SODIUM 40 MG: 40 INJECTION SUBCUTANEOUS at 04:10

## 2022-10-05 RX ADMIN — SODIUM CHLORIDE: 9 INJECTION, SOLUTION INTRAVENOUS at 06:10

## 2022-10-05 RX ADMIN — TAMSULOSIN HYDROCHLORIDE 0.4 MG: 0.4 CAPSULE ORAL at 08:10

## 2022-10-05 RX ADMIN — ACETAMINOPHEN 650 MG: 325 TABLET ORAL at 04:10

## 2022-10-05 RX ADMIN — LEVOTHYROXINE SODIUM 25 MCG: 25 TABLET ORAL at 08:10

## 2022-10-05 RX ADMIN — TRAMADOL HYDROCHLORIDE 50 MG: 50 TABLET, COATED ORAL at 05:10

## 2022-10-05 RX ADMIN — AMLODIPINE BESYLATE 10 MG: 5 TABLET ORAL at 08:10

## 2022-10-05 NOTE — PT/OT/SLP PROGRESS
Physical Therapy Treatment    Patient Name:  Heidy Gay   MRN:  [id number]    Recommendations:     Discharge Recommendations:  home with home health, home (with family assistance)   Discharge Equipment Recommendations: walker, rolling   Barriers to discharge:  acuity of illness    Assessment:     Heidy Gay is a 89 y.o. female admitted with a medical diagnosis of Dizziness.  She presents with the following impairments/functional limitations:  weakness, impaired endurance, impaired self care skills, impaired functional mobility, gait instability.    Rehab Prognosis: Good; patient would benefit from acute skilled PT services to address these deficits and reach maximum level of function.    Recent Surgery: * No surgery found *      Plan:     During this hospitalization, patient to be seen daily to address the identified rehab impairments via gait training, therapeutic activities, therapeutic exercises, neuromuscular re-education and progress toward the following goals:    Plan of Care Expires:  22    Subjective     Chief Complaint: dizziness when coming into sitting/standing  Patient/Family Comments/goals: to go home  Pain/Comfort:  Pain Rating 1: 0/10  Pt reports being less symptomatic in terms of dizziness compared to yesterday.     Objective:     Communicated with RN prior to session.  Patient found HOB elevated with peripheral IV, telemetry upon PT entry to room.     General Precautions: Standard, fall   Orthopedic Precautions:    Braces:    Respiratory Status: Room air     BP in supine: 146/63 mmHg  BP sitting EOB: 117/66 mmHg  BP standin/66 mmHg  BP after standing from toilet: 102/60 mmHg     Functional Mobility:  Bed Mobility:     Supine to Sit: modified independence  Transfers:     Sit to Stand:  minimum assistance with rolling walker from bed. Pt stood from toilet after voiding urine with Tong with RW.   Gait: pt ambulated 80 ft with a slow symmetrical gt pattern with a RW and CGA.        AM-PAC 6 CLICK MOBILITY  Turning over in bed (including adjusting bedclothes, sheets and blankets)?: 4  Sitting down on and standing up from a chair with arms (e.g., wheelchair, bedside commode, etc.): 3  Moving from lying on back to sitting on the side of the bed?: 4  Moving to and from a bed to a chair (including a wheelchair)?: 3  Need to walk in hospital room?: 3  Climbing 3-5 steps with a railing?: 3  Basic Mobility Total Score: 20       Patient left  on toilet with CNA  with all lines intact, RN notified, and CNA present..    GOALS:   Multidisciplinary Problems       Physical Therapy Goals          Problem: Physical Therapy    Goal Priority Disciplines Outcome Goal Variances Interventions   Physical Therapy Goal     PT, PT/OT Ongoing, Progressing     Description: Goals to be met by: 11/3/2022     Patient will increase functional independence with mobility by performin. Supine to sit with [place]  2. Sit to supine with [place]  3. Sit to stand transfer with Modified [place]  4. Gait  x 500 feet with Modified [place] using Rolling Walker vs LRAD.                          Time Tracking:     PT Received On: 10/05/22  PT Start Time: 830     PT Stop Time: 847  PT Total Time (min): 17 min     Billable Minutes: Therapeutic Activity 17 minutes    Treatment Type: Treatment  PT/PTA: PT     PTA Visit Number: 1     10/05/2022

## 2022-10-05 NOTE — PLAN OF CARE
10/05/22 1524   Discharge Assessment   Assessment Type Discharge Planning Assessment   Confirmed/corrected address, phone number and insurance Yes   Confirmed Demographics Correct on Facesheet   Source of Information patient;family   Lives With alone   Do you expect to return to your current living situation? Yes   Do you have help at home or someone to help you manage your care at home? Yes   Current cognitive status: Alert/Oriented   Walking or Climbing Stairs Difficulty ambulation difficulty, requires equipment   Dressing/Bathing Difficulty none   Equipment Currently Used at Home shower chair;walker, rolling;bedside commode;cane, straight   Do you currently have service(s) that help you manage your care at home? No   Do you take prescription medications? Yes   Do you have prescription coverage? Yes   Do you have any problems affording any of your prescribed medications? No   How do you get to doctors appointments? family or friend will provide   Are you on dialysis? No   Discharge Plan A Skilled Nursing Facility   Discharge Plan B Home Health   DME Needed Upon Discharge  none   Discharge Plan discussed with: Spouse/sig other;Patient   Order for rehab/snf noted. List of providers given. FOC obtained for TCU. Referral sent via [facility].

## 2022-10-05 NOTE — PROGRESS NOTES
"[facility]  Hospital Medicine Progress Note        Chief Complaint: seeing black spots on walls.    HPI:   89 yo female with pmhx HTN, CAD/stent, hypothyroidism.    presents to the ED with c/o seeing "black spots" on her bedroom wall at night for the past two weeks. She states that she goes to bed every night at 7:00 but reads before she falls asleep.  She states that when she was reading in bed when she would look up she would see a floating black spot on the wall in front of her.  She is unsure how long it last but states eventually I would  fall asleep and when I would wake up it was not longer there. However, for the past 2 nights she noticed the spots around 7:00 p.m. and then again around midnight when she would wake up and go to the bathroom, this time she reports feeling off balance" when she went to the bathroom.  She did not fall, no head trauma or loss of consciousness.  She denies nausea, vomiting, headache ,fever, chills, blurred vision, unilateral weakness, history of TIA or CVA, medication changes. She does not take a sleep aid.  The symptoms only occur at nighttime.        Initial ED VS:  Temperature 97.9°, HR 76, RR 18, /50.  EKG NSR with nonspecific intraventricular block Labs remarkable for hemoglobin 11.3, hematocrit 35.9, calcium 10.4, UA 1+ bacteria.  Patient without urinary complaints.  She does report for the past 3 years she has to go to the bathroom every 2 hours and this is nothing new.  CT of her head with age related changes.  No acute findings.  She is being admitted to the hospitalist service for further management     On initial evaluation, patient was complaining burning with urination there was significantly foul smell, she was given another dose of Rocephin during hospital stay.    Patient is no longer seeing black spots and she feels back to her baseline though slightly weak.    PT/OT has been working with patient daily.        Interval Hx: "   Patient is tolerating her meals.     She lives alone in [place] and does all ADLs and I ADLs with no assistance.  PT/OT has evaluated and recommends rehabilitation, patient is agreeable.   has been consulted for such.    Objective/physical exam:  Neuro:  Alert awake oriented, comprehension intact  comprehension intact.  She is hard of hearing.  Purewic in place.    General: Appears comfortable, no acute distress.  Integumentary: Warm, dry, intact.  Musculoskeletal: Purposeful movement noted.   Respiratory: No accessory muscle use. Breath sounds are equal.  Cardiovascular: Regular rate. No peripheral edema.    VITAL SIGNS: 24 HRS MIN & MAX LAST   Temp  Min: 97.5 °F (36.4 °C)  Max: 98.2 °F (36.8 °C) 97.5 °F (36.4 °C)   BP  Min: 122/53  Max: 142/57 135/68   Pulse  Min: 77  Max: 99  95   Resp  Min: 18  Max: 20 20     SpO2  Min: 95 %  Max: 99 % 97 %     MRI Brain Without Contrast  Narrative: EXAMINATION:  MRI BRAIN WITHOUT CONTRAST    CLINICAL HISTORY:  Transient ischemic attack (TIA);Stroke, follow up;    TECHNIQUE:  Multiplanar multisequence MR imaging of the brain was performed without contrast.    COMPARISON:  None    FINDINGS:  No intracranial mass or lesion is seen.  No hemorrhage is seen.  No acute infarct is seen.  No diffusion abnormality seen.  There is diffuse cerebral atrophy seen along with some compensatory ventricular dilatation and periventricular white matter change consistent with patient's age.  Posterior fossa appears normal.  Calvarium is intact.  Paranasal sinuses appear grossly unremarkable.  Impression: Chronic age related changes    Otherwise unremarkable    Electronically signed by: Jaydon Morrow  Date:    10/04/2022  Time:    13:08    Recent Labs   Lab 10/03/22  1355   WBC 7.9   RBC 3.99*   HGB 11.3*   HCT 35.9*   MCV 90.0   MCH 28.3   MCHC 31.5*   RDW 14.0      MPV 9.0       Recent Labs   Lab 10/03/22  1355      K 4.0   CO2 29   BUN 18.7   CREATININE 0.67    CALCIUM 10.4*   ALBUMIN 3.7   ALKPHOS 71   ALT 9   AST 13   BILITOT 0.3          Microbiology Results (last 7 days)       ** No results found for the last 168 hours. **             See below for Radiology    Scheduled Med:   amLODIPine  10 mg Oral Daily    cefTRIAXone (ROCEPHIN) IVPB  1 g Intravenous Q24H    enoxaparin  40 mg Subcutaneous Daily    levothyroxine  25 mcg Oral Daily    pravastatin  40 mg Oral Daily    tamsulosin  1 capsule Oral Daily        Continuous Infusions:   sodium chloride 0.9% 75 mL/hr at 10/05/22 0632        PRN Meds:  acetaminophen, aluminum-magnesium hydroxide-simethicone, bisacodyL, meclizine, ondansetron, polyethylene glycol, prochlorperazine, senna-docusate 8.6-50 mg, sodium chloride 0.9%, trazodone     Nutrition Status:    Assessment/Plan:  Visual Hallucination   Gait Ataxia  Bradycardia  symptomatic Bacteruria  Essential hypertension  Hyperlipidemia  CAD -remote history stent placement  Hypothyroidism     Plan  Patient presents for visual hallucinations, neurology has been consulted, CT of the head is unremarkable.  MRI of the brain also unremarkable, etiology of these hallucinations are unknown however patient is back to baseline.  Labs are significantly unremarkable.    Patient has symptomatic urinary complaints, has received 2 doses of Rocephin, will order will Rocephin to be discontinue in the a.m..    Consider pyridium if bladder spams occur.     CM consulted for rehab placement-patient agreeable.   PRN melatonin for insomnia.    Continue telemetry.    Consult PT/OT.    Reviewed and restarted appropriate home medications.     Anticipated discharge and Disposition:  Pending    All diagnosis and differential diagnosis have been reviewed; assessment and plan has been documented; I have personally reviewed the labs and test results that are presently available; I have reviewed the patients medication list; I have reviewed the consulting providers response and recommendations. I have  reviewed or attempted to review medical records based upon their availability    I will continue to monitor closely and make adjustments to medical management as needed.      Glo Meneses, DO   10/05/2022        This note was created with the assistance of Dragon voice recognition software. There may be transcription errors as a result of using this technology however minimal. Effort has been made to assure accuracy of transcription but any obvious errors or omissions should be clarified with the author of the document.

## 2022-10-05 NOTE — PT/OT/SLP PROGRESS
Occupational Therapy   Treatment    Name: Heidy Gay  MRN: [id number]  Admitting Diagnosis:  Dizziness       Recommendations:     Discharge Recommendations: rehabilitation facility vs home with family assist and with HH  Discharge Equipment Recommendations:  walker, rolling  Barriers to discharge:       Assessment:     Heidy Gay is a 89 y.o. female with a medical diagnosis of Dizziness.  She presents with decreased reports of dizziness, BP stable, however tachycardic.  BP as follows 119/62 HR 94 sitting up in chair, 107/65  standing, 112/61 standing second reading, HR increased to 152 with attempted ambulation to toilet, returned to seated position.  HR decreased to 98 and /53.  RN aware and monitoring.  Reports she needs to be started back on a home med for HR control.  Performance deficits affecting function are impaired endurance, weakness, impaired functional mobility, impaired self care skills.     Rehab Prognosis:  Good; patient would benefit from acute skilled OT services to address these deficits and reach maximum level of function.       Plan:     Patient to be seen 5 x/week to address the above listed problems via self-care/home management, therapeutic activities, therapeutic exercises  Plan of Care Expires: 10/19/22  Plan of Care Reviewed with: patient    Subjective     Pain/Comfort:  Pain Rating 1: 0/10    Objective:     Communicated with: RN prior to session.  Patient found up in chair with peripheral IV upon OT entry to room.    General Precautions: Standard, fall   Orthopedic Precautions:N/A   Braces: N/A  Respiratory Status: Room air     Occupational Performance:     Functional Mobility/Transfers:  Patient completed Sit <> Stand Transfer with CGA with use of RW, gait belt   Functional Mobility: Pt with tachycardia with attempted ambulation to toilet with use of RW.  HR increased to 152, returned to seated position.      Activities of Daily Living:  Toileting: unable to complete  due to tachycardia        Treatment & Education:  Pt reports she feels better today.  Discussed with RN and CNA following session.      Patient left up in chair with all lines intact and call button in reach    GOALS:   Multidisciplinary Problems       Occupational Therapy Goals          Problem: Occupational Therapy    Goal Priority Disciplines Outcome Interventions   Occupational Therapy Goal     OT, PT/OT Ongoing, Progressing    Description: STG: to be met in 2 weeks  1. UB dressing independently.  2. LB dressing with RW Mod I.  3. Toileting, toilet t/f, functional mobility to toilet with RW mod I.  4. Grooming standing at sink with RW mod I.  5. Shower t/f mod I with RW and SC.                         Time Tracking:     OT Date of Treatment:    OT Start Time: 1059  OT Stop Time: 1117  OT Total Time (min): 18 min    Billable Minutes:Self Care/Home Management 1    OT/ROD: OT     ROD Visit Number: 0    10/5/2022

## 2022-10-06 VITALS
RESPIRATION RATE: 16 BRPM | DIASTOLIC BLOOD PRESSURE: 56 MMHG | TEMPERATURE: 98 F | HEIGHT: 68 IN | WEIGHT: 110 LBS | SYSTOLIC BLOOD PRESSURE: 129 MMHG | OXYGEN SATURATION: 96 % | HEART RATE: 86 BPM | BODY MASS INDEX: 16.67 KG/M2

## 2022-10-06 PROBLEM — R53.1 WEAKNESS: Status: ACTIVE | Noted: 2022-10-06

## 2022-10-06 PROBLEM — R53.81 PHYSICAL DECONDITIONING: Status: ACTIVE | Noted: 2022-10-06

## 2022-10-06 LAB — SARS-COV-2 RDRP RESP QL NAA+PROBE: NEGATIVE

## 2022-10-06 PROCEDURE — 63600175 PHARM REV CODE 636 W HCPCS: Performed by: STUDENT IN AN ORGANIZED HEALTH CARE EDUCATION/TRAINING PROGRAM

## 2022-10-06 PROCEDURE — 97530 THERAPEUTIC ACTIVITIES: CPT

## 2022-10-06 PROCEDURE — 25000003 PHARM REV CODE 250: Performed by: STUDENT IN AN ORGANIZED HEALTH CARE EDUCATION/TRAINING PROGRAM

## 2022-10-06 PROCEDURE — 25000003 PHARM REV CODE 250: Performed by: NURSE PRACTITIONER

## 2022-10-06 PROCEDURE — 63600175 PHARM REV CODE 636 W HCPCS: Performed by: NURSE PRACTITIONER

## 2022-10-06 PROCEDURE — 87635 SARS-COV-2 COVID-19 AMP PRB: CPT | Performed by: INTERNAL MEDICINE

## 2022-10-06 RX ORDER — METOPROLOL TARTRATE 25 MG/1
12.5 TABLET, FILM COATED ORAL 2 TIMES DAILY
Qty: 30 TABLET | Refills: 11 | Status: ON HOLD | OUTPATIENT
Start: 2022-10-06 | End: 2022-10-20

## 2022-10-06 RX ORDER — METHENAMINE HIPPURATE 1000 MG/1
1 TABLET ORAL 2 TIMES DAILY
Qty: 60 TABLET | Refills: 0 | Status: ON HOLD | OUTPATIENT
Start: 2022-10-06 | End: 2022-10-20

## 2022-10-06 RX ORDER — SODIUM CHLORIDE 9 MG/ML
75 INJECTION, SOLUTION INTRAVENOUS CONTINUOUS
Qty: 1000 ML | Refills: 0 | Status: ON HOLD | OUTPATIENT
Start: 2022-10-06 | End: 2022-10-20

## 2022-10-06 RX ADMIN — PRAVASTATIN SODIUM 40 MG: 40 TABLET ORAL at 08:10

## 2022-10-06 RX ADMIN — TAMSULOSIN HYDROCHLORIDE 0.4 MG: 0.4 CAPSULE ORAL at 08:10

## 2022-10-06 RX ADMIN — LEVOTHYROXINE SODIUM 25 MCG: 25 TABLET ORAL at 08:10

## 2022-10-06 RX ADMIN — AMLODIPINE BESYLATE 10 MG: 5 TABLET ORAL at 08:10

## 2022-10-06 RX ADMIN — SODIUM CHLORIDE: 9 INJECTION, SOLUTION INTRAVENOUS at 12:10

## 2022-10-06 RX ADMIN — ENOXAPARIN SODIUM 40 MG: 40 INJECTION SUBCUTANEOUS at 04:10

## 2022-10-06 RX ADMIN — TRAMADOL HYDROCHLORIDE 50 MG: 50 TABLET, COATED ORAL at 04:10

## 2022-10-06 RX ADMIN — TRAMADOL HYDROCHLORIDE 50 MG: 50 TABLET, COATED ORAL at 12:10

## 2022-10-06 RX ADMIN — CEFTRIAXONE SODIUM 1 G: 1 INJECTION, POWDER, FOR SOLUTION INTRAMUSCULAR; INTRAVENOUS at 12:10

## 2022-10-06 NOTE — PLAN OF CARE
Transfer to TCU today. Dr. Mendoza is admitting physician. TCU will call nurse for report. Juan to transport at 1700.

## 2022-10-06 NOTE — DISCHARGE SUMMARY
"[facility] Hospital Medicine Discharge Summary    Admit Date: 10/3/2022  Discharge Date and Time: 10/6/09724:10 PM  Admitting Physician:  Team  Discharging Physician: Vincenzo Hunter MD.  Primary Care Physician: Jay Arroyo MD  Consults: None    Discharge Diagnoses:  Visual Hallucination - probably secondary to episodes of low bp/orthostatics    Ivvd/hypercalcemia    Gait Ataxia    Bradycardia    symptomatic Bacteruria- neg urine cx    Essential hypertension  Hyperlipidemia  CAD -remote history stent placement  Hypothyroidism   Urinary retention  Recurrent uti's    Hospital Course:   89 yo female with pmhx HTN, CAD/stent, hypothyroidism presents to the ED with c/o seeing "black spots" on her bedroom wall at night for the past two weeks. She states that she goes to bed every night at 7:00 but reads before she falls asleep.  She states that when she was reading in bed when she would look up she would see a floating black spot on the wall in front of her.  She is unsure how long it last but states eventually I would  fall asleep and when I would wake up it was not longer there. However, for the past 2 nights she noticed the spots around 7:00 p.m. and then again around midnight when she would wake up and go to the bathroom, this time she reports feeling off balance" when she went to the bathroom.  She did not fall, no head trauma or loss of consciousness.  She denies nausea, vomiting, headache ,fever, chills, blurred vision, unilateral weakness, history of TIA or CVA, medication changes. She does not take a sleep aid.  The symptoms only occur at nighttime.     Initial ED VS:  Temperature 97.9°, HR 76, RR 18, /50.  EKG NSR with nonspecific intraventricular block Labs remarkable for hemoglobin 11.3, hematocrit 35.9, calcium 10.4, UA 1+ bacteria.  Patient without urinary complaints.  She does report for the past 3 years she has to go to the bathroom every 2 hours and this is " nothing new.  CT of her head with age related changes.  No acute findings.  She is being admitted to the hospitalist service for further management  On initial evaluation, patient was complaining burning with urination there was significantly foul smell, she was given another dose of Rocephin during hospital stay but her urine did not qualified for reflex, bacteriuria was the only abnormality present. Mri brain on 10-4-22 negative for acute findings  Patient is no longer seeing black spots and she feels back to her baseline though slightly weak.  Multiple BP meds had to be discontinued secondary to positive orthostatics but will resume metoprolol tartrate at 12.5 mgs po bid. Continue to hold norvasc/hydralazine as well as ace inhibitor.  PT/OT has been working with patient daily w recs for rehab. Pt very anxious but able to ambulate 200 feet w RW. Going over her labs and taking in consideration dysuria / hypercalcemia  plus positive orthostatic maybe she was IVVD.Pt will be transfer to tcu but will continue hydration while inpt at tcu. Case was discussed with son at length , he will follow up with dr ferguson as well as dr cano.  Pt was seen and examined on the day of discharge  Vitals:  VITAL SIGNS: 24 HRS MIN & MAX LAST   Temp  Min: 97 °F (36.1 °C)  Max: 98.1 °F (36.7 °C) 97 °F (36.1 °C)   BP  Min: 116/54  Max: 146/54 (!) 146/54     Pulse  Min: 72  Max: 89  72   Resp  Min: 12  Max: 20 18   SpO2  Min: 77 %  Max: 97 % (!) 77 %         Physical Exam:  Neuro:  Alert awake oriented, comprehension intact  comprehension intact.  She is hard of hearing.  General: Appears comfortable, no acute distress.  Integumentary: Warm, dry, intact.  Musculoskeletal: Purposeful movement noted.   Respiratory: No accessory muscle use. Breath sounds are equal.  Cardiovascular: Regular rate. No peripheral edema.    Procedures Performed: No admission procedures for hospital encounter.     Significant Diagnostic Studies: See Full reports for  all details    Recent Labs   Lab 10/03/22  1355   WBC 7.9   RBC 3.99*   HGB 11.3*   HCT 35.9*   MCV 90.0   MCH 28.3   MCHC 31.5*   RDW 14.0      MPV 9.0       Recent Labs   Lab 10/03/22  1355      K 4.0   CO2 29   BUN 18.7   CREATININE 0.67   CALCIUM 10.4*   ALBUMIN 3.7   ALKPHOS 71   ALT 9   AST 13   BILITOT 0.3        Microbiology Results (last 7 days)       ** No results found for the last 168 hours. **             MRI Brain Without Contrast  Narrative: EXAMINATION:  MRI BRAIN WITHOUT CONTRAST    CLINICAL HISTORY:  Transient ischemic attack (TIA);Stroke, follow up;    TECHNIQUE:  Multiplanar multisequence MR imaging of the brain was performed without contrast.    COMPARISON:  None    FINDINGS:  No intracranial mass or lesion is seen.  No hemorrhage is seen.  No acute infarct is seen.  No diffusion abnormality seen.  There is diffuse cerebral atrophy seen along with some compensatory ventricular dilatation and periventricular white matter change consistent with patient's age.  Posterior fossa appears normal.  Calvarium is intact.  Paranasal sinuses appear grossly unremarkable.  Impression: Chronic age related changes    Otherwise unremarkable    Electronically signed by: Jaydon Morrow  Date:    10/04/2022  Time:    13:08         Medication List        ASK your doctor about these medications      allopurinoL 100 MG tablet  Commonly known as: ZYLOPRIM     amLODIPine 10 MG tablet  Commonly known as: NORVASC     hydrALAZINE 25 MG tablet  Commonly known as: APRESOLINE     levothyroxine 25 MCG tablet  Commonly known as: SYNTHROID     methenamine 1 gram Tab  Commonly known as: HIPREX     metoprolol tartrate 25 MG tablet  Commonly known as: LOPRESSOR     pravastatin 40 MG tablet  Commonly known as: PRAVACHOL     ramipriL 10 MG capsule  Commonly known as: ALTACE     tamsulosin 0.4 mg Cap  Commonly known as: FLOMAX               Explained in detail to the patient about the discharge plan, medications, and  follow-up visits. Pt understands and agrees with the treatment plan  Discharge Disposition:    Discharged Condition: stable  Diet-   Dietary Orders (From admission, onward)       Start     Ordered    10/03/22 2015  Diet heart healthy  (Diet/Nutrition [facility])  Diet effective now         10/03/22 2014                   Medications Per DC med rec  Activities as tolerated   Follow-up Information       Jay Arroyo MD Follow up.    Specialty: Family Medicine  Contact information:  [address] [facility]  [facility] [address]  [phone]               Ethan Anderson MD .    Specialty: Cardiology  Contact information:  [address] [facility] [id number] [phone]                           For further questions contact hospitalist office    Discharge time 33 minutes    For worsening symptoms, chest pain, shortness of breath, increased abdominal pain, high grade fever, stroke or stroke like symptoms, immediately go to the nearest Emergency Room or call 911 as soon as possible.      Vincenzo Fierro M.D, on 10/6/2022. at 1:10 PM.

## 2022-10-06 NOTE — PT/OT/SLP PROGRESS
Physical Therapy Treatment    Patient Name:  Heidy Gay   MRN:  [id number]    Recommendations:     Discharge Recommendations:  nursing facility, skilled, home with home health   Discharge Equipment Recommendations: walker, rolling   Barriers to discharge:  acuity of illness    Assessment:     Heidy Gay is a 89 y.o. female admitted with a medical diagnosis of Dizziness.  She presents with the following impairments/functional limitations:  weakness, impaired endurance, impaired self care skills, impaired functional mobility, gait instability, decreased safety awareness. Pt reported no c/o dizziness through treatment session.    Rehab Prognosis: Good; patient would benefit from acute skilled PT services to address these deficits and reach maximum level of function.    Recent Surgery: * No surgery found *      Plan:     During this hospitalization, patient to be seen daily to address the identified rehab impairments via gait training, therapeutic activities, therapeutic exercises, neuromuscular re-education and progress toward the following goals:    Plan of Care Expires:  22    Subjective     Chief Complaint: anxious   Patient/Family Comments/goals: to get stronger  Pain/Comfort:  Pain Rating 1: 0/10      Objective:     Communicated with RN prior to session.  Patient found HOB elevated with peripheral IV, telemetry, PureWick upon PT entry to room.     General Precautions: Standard, fall   Orthopedic Precautions:    Braces:    Respiratory Status: Room air     BP supine: 145/67 mmHg  BP sitting EOB: 109/57 mmHg  BP standin/56 mmHg    Functional Mobility:  Bed Mobility:     Supine to Sit: supervision  Sit to Supine: supervision  Transfers:     Sit to Stand:  minimum assistance with rolling walker  Toilet Transfer: minimum assistance with  rolling walker  using  Stand Pivot  Gait: pt ambulated 200 ft with a steady symmetrical gt pattern with a RW and CGA.       AM-PAC 6 CLICK MOBILITY  Turning over in  bed (including adjusting bedclothes, sheets and blankets)?: 3  Sitting down on and standing up from a chair with arms (e.g., wheelchair, bedside commode, etc.): 3  Moving from lying on back to sitting on the side of the bed?: 3  Moving to and from a bed to a chair (including a wheelchair)?: 3  Need to walk in hospital room?: 3  Climbing 3-5 steps with a railing?: 3  Basic Mobility Total Score: 18         Patient left HOB elevated with all lines intact, call button in reach, and RN notified..    GOALS:   Multidisciplinary Problems       Physical Therapy Goals          Problem: Physical Therapy    Goal Priority Disciplines Outcome Goal Variances Interventions   Physical Therapy Goal     PT, PT/OT Ongoing, Progressing     Description: Goals to be met by: 11/3/2022     Patient will increase functional independence with mobility by performin. Supine to sit with [place]  2. Sit to supine with [place]  3. Sit to stand transfer with Modified [place]  4. Gait  x 500 feet with Modified [place] using Rolling Walker vs LRAD.                          Time Tracking:     PT Received On: 10/06/22  PT Start Time: 831     PT Stop Time: 858  PT Total Time (min): 27 min     Billable Minutes: Therapeutic Activity 27 minutes    Treatment Type: Treatment  PT/PTA: PT     PTA Visit Number: 2     10/06/2022

## 2022-10-06 NOTE — PT/OT/SLP PROGRESS
Occupational Therapy   Treatment    Name: Heidy Gay  MRN: [id number]  Admitting Diagnosis:  Dizziness       Recommendations:     Discharge Recommendations: rehabilitation facility, nursing facility, skilled (awaiting acceptance to TCU)  Discharge Equipment Recommendations:  none  Barriers to discharge:       Assessment:     Heidy Gay is a 89 y.o. female with a medical diagnosis of Dizziness.  She reports she had a long night because of sciatic pain but it has now resolved with addition of tramadol. Performance deficits affecting function are impaired endurance, weakness, impaired functional mobility, impaired self care skills.     Rehab Prognosis:  Good; patient would benefit from acute skilled OT services to address these deficits and reach maximum level of function.       Plan:     Patient to be seen 5 x/week to address the above listed problems via self-care/home management, therapeutic activities, therapeutic exercises  Plan of Care Expires: 10/19/22  Plan of Care Reviewed with: patient, son    Subjective     Pain/Comfort:  Pain Rating 1: 0/10    Objective:     Communicated with: SUNDAY Yates prior to session.  Patient found HOB elevated with peripheral IV upon OT entry to room.    General Precautions: Standard, fall   Orthopedic Precautions:N/A   Braces: N/A  Respiratory Status: Room air   BP: 129/67 with activity  HR around 90 with rest, increased to 175 with functional mobility around room.  RN notified.  Occupational Performance:     Bed Mobility:    Patient completed Supine to Sit with stand by assistance  Patient completed Sit to Supine with stand by assistance     Functional Mobility/Transfers:  Patient completed Sit <> Stand Transfer with contact guard assistance  with  rolling walker   Functional Mobility: Pt required CGA, min cues for safety and use of RW around obstacles.  Does not utilize RW at baseline.    Treatment & Education:  Activity modified due to tachycardia.  No c/o dizziness  throughout.  RN notified of HR.     Patient left HOB elevated with all lines intact, son present.  CM to discuss TCU status with son.  Son in agreement.    GOALS:   Multidisciplinary Problems       Occupational Therapy Goals          Problem: Occupational Therapy    Goal Priority Disciplines Outcome Interventions   Occupational Therapy Goal     OT, PT/OT Ongoing, Progressing    Description: STG: to be met in 2 weeks  1. UB dressing independently.  2. LB dressing with RW Mod I.  3. Toileting, toilet t/f, functional mobility to toilet with RW mod I.  4. Grooming standing at sink with RW mod I.  5. Shower t/f mod I with RW and SC.                         Time Tracking:     OT Date of Treatment:    OT Start Time: 1048  OT Stop Time: 1111  OT Total Time (min): 23 min    Billable Minutes:Therapeutic Activity 2    OT/ROD: OT     ROD Visit Number: 0    10/6/2022

## 2025-04-19 ENCOUNTER — HOSPITAL ENCOUNTER (INPATIENT)
Facility: HOSPITAL | Age: OVER 89
LOS: 5 days | DRG: 641 | End: 2025-04-24
Attending: STUDENT IN AN ORGANIZED HEALTH CARE EDUCATION/TRAINING PROGRAM | Admitting: INTERNAL MEDICINE
Payer: MEDICARE

## 2025-04-19 DIAGNOSIS — E86.0 DEHYDRATION: ICD-10-CM

## 2025-04-19 DIAGNOSIS — R42 DIZZINESS: ICD-10-CM

## 2025-04-19 DIAGNOSIS — Z78.9 UNABLE TO CARE FOR SELF: ICD-10-CM

## 2025-04-19 DIAGNOSIS — R53.1 WEAKNESS: ICD-10-CM

## 2025-04-19 DIAGNOSIS — R52 PAIN: ICD-10-CM
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
## 2025-04-19 LAB
ALBUMIN SERPL-MCNC: 3.6 G/DL (ref 3.4–4.8)
ALBUMIN/GLOB SERPL: 1 RATIO (ref 1.1–2)
ALP SERPL-CCNC: 76 UNIT/L (ref 40–150)
ALT SERPL-CCNC: 10 UNIT/L (ref 0–55)
ANION GAP SERPL CALC-SCNC: 10 MEQ/L
AST SERPL-CCNC: 19 UNIT/L (ref 11–45)
BACTERIA #/AREA URNS AUTO: ABNORMAL /HPF
BASOPHILS # BLD AUTO: 0.02 X10(3)/MCL
BASOPHILS NFR BLD AUTO: 0.1 %
BILIRUB SERPL-MCNC: 0.6 MG/DL
BILIRUB UR QL STRIP.AUTO: NEGATIVE
BUN SERPL-MCNC: 20.6 MG/DL (ref 9.8–20.1)
CALCIUM SERPL-MCNC: 10.3 MG/DL (ref 8.4–10.2)
CHLORIDE SERPL-SCNC: 108 MMOL/L (ref 98–111)
CK SERPL-CCNC: 261 U/L (ref 29–168)
CLARITY UR: CLEAR
CO2 SERPL-SCNC: 24 MMOL/L (ref 23–31)
COLOR UR AUTO: ABNORMAL
CREAT SERPL-MCNC: 0.69 MG/DL (ref 0.55–1.02)
CREAT/UREA NIT SERPL: 30
EOSINOPHIL # BLD AUTO: 0 X10(3)/MCL (ref 0–0.9)
EOSINOPHIL NFR BLD AUTO: 0 %
ERYTHROCYTE [DISTWIDTH] IN BLOOD BY AUTOMATED COUNT: 14.7 % (ref 11.5–17)
GFR SERPLBLD CREATININE-BSD FMLA CKD-EPI: >60 ML/MIN/1.73/M2
GLOBULIN SER-MCNC: 3.7 GM/DL (ref 2.4–3.5)
GLUCOSE SERPL-MCNC: 118 MG/DL (ref 75–121)
GLUCOSE UR QL STRIP: NORMAL
HCT VFR BLD AUTO: 33 % (ref 37–47)
HGB BLD-MCNC: 10.6 G/DL (ref 12–16)
HGB UR QL STRIP: NEGATIVE
IMM GRANULOCYTES # BLD AUTO: 0.06 X10(3)/MCL (ref 0–0.04)
IMM GRANULOCYTES NFR BLD AUTO: 0.4 %
KETONES UR QL STRIP: ABNORMAL
LEUKOCYTE ESTERASE UR QL STRIP: 250
LYMPHOCYTES # BLD AUTO: 0.81 X10(3)/MCL (ref 0.6–4.6)
LYMPHOCYTES NFR BLD AUTO: 5.7 %
MCH RBC QN AUTO: 28.4 PG (ref 27–31)
MCHC RBC AUTO-ENTMCNC: 32.1 G/DL (ref 33–36)
MCV RBC AUTO: 88.5 FL (ref 80–94)
MONOCYTES # BLD AUTO: 0.86 X10(3)/MCL (ref 0.1–1.3)
MONOCYTES NFR BLD AUTO: 6.1 %
NEUTROPHILS # BLD AUTO: 12.45 X10(3)/MCL (ref 2.1–9.2)
NEUTROPHILS NFR BLD AUTO: 87.7 %
NITRITE UR QL STRIP: NEGATIVE
NRBC BLD AUTO-RTO: 0 %
OHS QRS DURATION: 126 MS
OHS QTC CALCULATION: 524 MS
PH UR STRIP: 7 [PH]
PLATELET # BLD AUTO: 360 X10(3)/MCL (ref 130–400)
PMV BLD AUTO: 10.3 FL (ref 7.4–10.4)
POTASSIUM SERPL-SCNC: 4.5 MMOL/L (ref 3.5–5.1)
PROT SERPL-MCNC: 7.3 GM/DL (ref 5.8–7.6)
PROT UR QL STRIP: NEGATIVE
RBC # BLD AUTO: 3.73 X10(6)/MCL (ref 4.2–5.4)
RBC #/AREA URNS AUTO: ABNORMAL /HPF
SODIUM SERPL-SCNC: 142 MMOL/L (ref 136–145)
SP GR UR STRIP.AUTO: 1.01 (ref 1–1.03)
SQUAMOUS #/AREA URNS LPF: ABNORMAL /HPF
TROPONIN I SERPL-MCNC: <0.01 NG/ML (ref 0–0.04)
TSH SERPL-ACNC: 3.08 UIU/ML (ref 0.35–4.94)
UROBILINOGEN UR STRIP-ACNC: NORMAL
WBC # BLD AUTO: 14.2 X10(3)/MCL (ref 4.5–11.5)
WBC #/AREA URNS AUTO: ABNORMAL /HPF
YEAST BUDDING URNS QL: ABNORMAL /HPF

## 2025-04-19 PROCEDURE — 93005 ELECTROCARDIOGRAM TRACING: CPT

## 2025-04-19 PROCEDURE — 63600175 PHARM REV CODE 636 W HCPCS

## 2025-04-19 PROCEDURE — 81015 MICROSCOPIC EXAM OF URINE: CPT

## 2025-04-19 PROCEDURE — 84443 ASSAY THYROID STIM HORMONE: CPT

## 2025-04-19 PROCEDURE — 80053 COMPREHEN METABOLIC PANEL: CPT

## 2025-04-19 PROCEDURE — 85025 COMPLETE CBC W/AUTO DIFF WBC: CPT

## 2025-04-19 PROCEDURE — 63600175 PHARM REV CODE 636 W HCPCS: Performed by: PHYSICIAN ASSISTANT

## 2025-04-19 PROCEDURE — 93010 ELECTROCARDIOGRAM REPORT: CPT | Mod: ,,, | Performed by: STUDENT IN AN ORGANIZED HEALTH CARE EDUCATION/TRAINING PROGRAM

## 2025-04-19 PROCEDURE — 25000003 PHARM REV CODE 250: Performed by: PHYSICIAN ASSISTANT

## 2025-04-19 PROCEDURE — 84484 ASSAY OF TROPONIN QUANT: CPT

## 2025-04-19 PROCEDURE — 11000001 HC ACUTE MED/SURG PRIVATE ROOM

## 2025-04-19 PROCEDURE — 93010 ELECTROCARDIOGRAM REPORT: CPT | Mod: ,,, | Performed by: INTERNAL MEDICINE

## 2025-04-19 PROCEDURE — 82550 ASSAY OF CK (CPK): CPT

## 2025-04-19 PROCEDURE — 99285 EMERGENCY DEPT VISIT HI MDM: CPT | Mod: 25

## 2025-04-19 PROCEDURE — 96360 HYDRATION IV INFUSION INIT: CPT

## 2025-04-19 PROCEDURE — 87040 BLOOD CULTURE FOR BACTERIA: CPT | Performed by: PHYSICIAN ASSISTANT

## 2025-04-19 RX ORDER — GLUCAGON 1 MG
1 KIT INJECTION
Status: DISCONTINUED | OUTPATIENT
Start: 2025-04-19 | End: 2025-04-24 | Stop reason: HOSPADM

## 2025-04-19 RX ORDER — ACETAMINOPHEN 325 MG/1
650 TABLET ORAL EVERY 4 HOURS PRN
Status: DISCONTINUED | OUTPATIENT
Start: 2025-04-19 | End: 2025-04-24 | Stop reason: HOSPADM

## 2025-04-19 RX ORDER — ACETAMINOPHEN 325 MG/1
650 TABLET ORAL EVERY 8 HOURS PRN
Status: DISCONTINUED | OUTPATIENT
Start: 2025-04-19 | End: 2025-04-24 | Stop reason: HOSPADM

## 2025-04-19 RX ORDER — ONDANSETRON HYDROCHLORIDE 2 MG/ML
4 INJECTION, SOLUTION INTRAVENOUS EVERY 4 HOURS PRN
Status: DISCONTINUED | OUTPATIENT
Start: 2025-04-19 | End: 2025-04-24 | Stop reason: HOSPADM

## 2025-04-19 RX ORDER — ENOXAPARIN SODIUM 100 MG/ML
40 INJECTION SUBCUTANEOUS EVERY 24 HOURS
Status: DISCONTINUED | OUTPATIENT
Start: 2025-04-19 | End: 2025-04-24 | Stop reason: HOSPADM

## 2025-04-19 RX ORDER — IBUPROFEN 200 MG
24 TABLET ORAL
Status: DISCONTINUED | OUTPATIENT
Start: 2025-04-19 | End: 2025-04-24 | Stop reason: HOSPADM

## 2025-04-19 RX ORDER — SODIUM CHLORIDE 0.9 % (FLUSH) 0.9 %
10 SYRINGE (ML) INJECTION EVERY 12 HOURS PRN
Status: DISCONTINUED | OUTPATIENT
Start: 2025-04-19 | End: 2025-04-24 | Stop reason: HOSPADM

## 2025-04-19 RX ORDER — IBUPROFEN 200 MG
16 TABLET ORAL
Status: DISCONTINUED | OUTPATIENT
Start: 2025-04-19 | End: 2025-04-24 | Stop reason: HOSPADM

## 2025-04-19 RX ORDER — METHENAMINE HIPPURATE 1000 MG/1
1 TABLET ORAL 2 TIMES DAILY
COMMUNITY

## 2025-04-19 RX ORDER — SODIUM CHLORIDE 9 MG/ML
1000 INJECTION, SOLUTION INTRAVENOUS CONTINUOUS
Status: ACTIVE | OUTPATIENT
Start: 2025-04-19 | End: 2025-04-20

## 2025-04-19 RX ADMIN — ENOXAPARIN SODIUM 40 MG: 40 INJECTION SUBCUTANEOUS at 05:04

## 2025-04-19 RX ADMIN — SODIUM CHLORIDE 1000 ML: 9 INJECTION, SOLUTION INTRAVENOUS at 05:04

## 2025-04-19 RX ADMIN — SODIUM CHLORIDE, POTASSIUM CHLORIDE, SODIUM LACTATE AND CALCIUM CHLORIDE 1000 ML: 600; 310; 30; 20 INJECTION, SOLUTION INTRAVENOUS at 02:04

## 2025-04-19 RX ADMIN — SODIUM CHLORIDE, POTASSIUM CHLORIDE, SODIUM LACTATE AND CALCIUM CHLORIDE 1000 ML: 600; 310; 30; 20 INJECTION, SOLUTION INTRAVENOUS at 04:04

## 2025-04-19 NOTE — ED PROVIDER NOTES
Encounter Date: 4/19/2025       History     Chief Complaint   Patient presents with    Fall     Baseline gcs 14, trip and fall, did not hit head. Abrasion to left FA and left calf. No thinners.      HPI  89 year old with PMH of HTN, Hyperlipidemia, CAD s/p stent placement in 2018 who presented to the ED following a trip and fall this morning. Patient lives alone and her son found her laying on the floor. She does not remember if she hit her head but states that she did not lose consciousness. She reports that she has been feeling dizzy upon standing from a seated position for a few weeks now but she has never fallen before. She is not on any blood thinners. Denies any head pain. Reports neck pain that has been ongoing since Wednesday, prior to the fall. She feels like she slept the wrong way. Family put her in a soft C-collar.      Review of patient's allergies indicates:   Allergen Reactions    Nsaids (non-steroidal anti-inflammatory drug) Other (See Comments)    Sulfa (sulfonamide antibiotics) Other (See Comments)     Past Medical History:   Diagnosis Date    Coronary artery disease     Gout, unspecified     Hypertension     Thyroid disease      Past Surgical History:   Procedure Laterality Date    CORONARY ANGIOPLASTY WITH STENT PLACEMENT       No family history on file.  Social History[1]  Review of Systems   Constitutional:  Negative for chills and fever.   Respiratory:  Negative for cough, shortness of breath and wheezing.    Cardiovascular:  Negative for chest pain and palpitations.   Neurological:  Positive for dizziness. Negative for tremors, seizures, syncope, speech difficulty, weakness, numbness and headaches.       Physical Exam     Initial Vitals [04/19/25 1033]   BP Pulse Resp Temp SpO2   (!) 116/40 (!) 55 18 97.8 °F (36.6 °C) 97 %      MAP       --         Physical Exam    Vitals reviewed.  Constitutional: She is not diaphoretic. No distress.   HENT:   Head: Normocephalic and atraumatic.   Neck:    Soft cervical collar in place   Cardiovascular:  Normal rate, regular rhythm, normal heart sounds and intact distal pulses.     Exam reveals no gallop and no friction rub.       No murmur heard.  Pulmonary/Chest: Breath sounds normal. No respiratory distress. She has no wheezes. She has no rhonchi. She has no rales.   Abdominal: Abdomen is soft. Bowel sounds are normal. She exhibits no distension. There is no abdominal tenderness. There is no rebound and no guarding.   Musculoskeletal:         General: No tenderness or edema. Normal range of motion.     Neurological: She is alert. GCS score is 15. GCS eye subscore is 4. GCS verbal subscore is 5. GCS motor subscore is 6.   Skin: Skin is warm.   Bruises to bilateral elbows and knees         ED Course   Procedures  Labs Reviewed   COMPREHENSIVE METABOLIC PANEL - Abnormal       Result Value    Sodium 142      Potassium 4.5      Chloride 108      CO2 24      Glucose 118      Blood Urea Nitrogen 20.6 (*)     Creatinine 0.69      Calcium 10.3 (*)     Protein Total 7.3      Albumin 3.6      Globulin 3.7 (*)     Albumin/Globulin Ratio 1.0 (*)     Bilirubin Total 0.6      ALP 76      ALT 10      AST 19      eGFR >60      Anion Gap 10.0      BUN/Creatinine Ratio 30     CBC WITH DIFFERENTIAL - Abnormal    WBC 14.20 (*)     RBC 3.73 (*)     Hgb 10.6 (*)     Hct 33.0 (*)     MCV 88.5      MCH 28.4      MCHC 32.1 (*)     RDW 14.7      Platelet 360      MPV 10.3      Neut % 87.7      Lymph % 5.7      Mono % 6.1      Eos % 0.0      Basophil % 0.1      Imm Grans % 0.4      Neut # 12.45 (*)     Lymph # 0.81      Mono # 0.86      Eos # 0.00      Baso # 0.02      Imm Gran # 0.06 (*)     NRBC% 0.0     CK - Abnormal    Creatine Kinase 261 (*)    TSH - Normal    TSH 3.081     TROPONIN I - Normal    Troponin-I <0.010     CBC W/ AUTO DIFFERENTIAL    Narrative:     The following orders were created for panel order CBC auto differential.  Procedure                               Abnormality          Status                     ---------                               -----------         ------                     CBC with Differential[[id number]]        Abnormal            Final result                 Please view results for these tests on the individual orders.   URINALYSIS, REFLEX TO URINE CULTURE          Imaging Results              X-Ray Chest AP Portable (Final result)  Result time 04/19/25 16:14:33      Final result by Jarret Nelson MD (04/19/25 16:14:33)                   Impression:      No acute findings.      Electronically signed by: Jarret Nelson  Date:    04/19/2025  Time:    16:14               Narrative:    EXAMINATION:  XR CHEST AP PORTABLE    CLINICAL HISTORY:  Unspecified fall, initial encounter    COMPARISON:  28 April 2021    FINDINGS:  Frontal view of the chest was obtained. Heart is not significantly enlarged.  There is aortic atherosclerosis.  Grossly clear lungs.  No pneumothorax.                                       X-Ray Pelvis Routine AP (In process)  Result time 04/19/25 16:22:52                     CT Cervical Spine Without Contrast (Final result)  Result time 04/19/25 12:27:12      Final result by Shon Fletcher MD (04/19/25 12:27:12)                   Impression:      No acute fracture or malalignment identified.      Electronically signed by: Shon Fletcher  Date:    04/19/2025  Time:    12:27               Narrative:    EXAMINATION:  CT CERVICAL SPINE WITHOUT CONTRAST    CLINICAL HISTORY:  Trip and fall    TECHNIQUE:  Multidetector axial images were performed of the cervical spine without and.  Images were reconstructed.    Automated exposure control was utilized to minimize radiation dose.  DLP 1159 mGy cm..    COMPARISON:  None available.    FINDINGS:  Cervical vertebrae stature is maintained.  There is grade 1 anterolisthesis of C3 on C4.  No acute fracture or malalignment identified.  There are degenerative changes with ventral osteophyte ridging indenting the ventral  thecal sac at C5-C6 and at C6-C7.  There is moderate right and mild narrowing of the left neural foramen at C7-T1.  There is no prevertebral soft tissue prominence.    This study does not exclude the possibility of intrathecal soft tissue, ligamentous or vascular injury.                                       CT Head Without Contrast (Final result)  Result time 04/19/25 12:21:34      Final result by Shon Fletcher MD (04/19/25 12:21:34)                   Impression:      No acute intracranial findings identified.      Electronically signed by: Shon Fletcher  Date:    04/19/2025  Time:    12:21               Narrative:    EXAMINATION:  CT HEAD WITHOUT CONTRAST    CLINICAL HISTORY:  Fall.  Abrasion    TECHNIQUE:  Sequential axial images were performed of the brain without contrast.    Dose product length of 1159 mGycm. Automated exposure control was utilized to minimize radiation dose.    COMPARISON:  October 3, 2022.    FINDINGS:  There is no intracranial mass effect, midline shift, hydrocephalus or hemorrhage. There is no sulcal effacement or low attenuation changes to suggest recent large vessel territory infarction. Chronic appearing periventricular and subcortical white matter low attenuation changes are present and are consistent with chronic microangiopathic ischemia. The ventricular system and sulcal markings prominence is consistent with atrophy. There is no acute extra axial fluid collection.  There is no acute depressed skull fracture visualized paranasal sinuses are clear without mucosal thickening, polypoidal abnormality or air-fluid levels. Mastoid air cells aeration is optimal.                                       Medications   lactated ringers bolus 1,000 mL (has no administration in time range)   lactated ringers bolus 1,000 mL (0 mLs Intravenous Stopped 4/19/25 1539)     Medical Decision Making  CT head negative for acute abnormalities. CT cervical spine revealed grade 1 anterolisthesis of C3 on C4  with some degenerative changes.  No acute fracture or malalignment identified.    Workup revealed leukocytosis with WBC of 14.2, normocytic anemia with H/H of 10.6/33, hypercalcemia with corrected calcium 10.6, CK elevated 261, normal troponin levels. U/A pending. Xray of chest negative. Xray of pelvis still pending.    Given 2L LR bolus.    Son reports that he does not feel comfortable with patient going back home as she lives by herself. She will need placement. Hospital medicine paged for IV hydration and placement. They will admit.    Amount and/or Complexity of Data Reviewed  Labs: ordered.  Radiology: ordered.    Risk  Decision regarding hospitalization.               ED Course as of 04/19/25 1622   Sat Apr 19, 2025   1606 Sinus tach with frequent PVCs.  Left axis deviation.  LVH.  No STEMI.  Time of 15 11 [RP]      ED Course User Index  [RP] Ricky Mercado MD                         Clinical Impression:  Final diagnoses:  [W19.XXXA] Fall (Primary)  [R52] Pain  [Z78.9] Unable to care for self  [E86.0] Dehydration          ED Disposition Condition    Admit                     [1]   Social History  Tobacco Use    Smoking status: Never    Smokeless tobacco: Never   Substance Use Topics    Alcohol use: Never        Rosalinda Thomason MD  Resident  04/19/25 2117

## 2025-04-19 NOTE — FIRST PROVIDER EVALUATION
"Medical screening examination initiated.  I have conducted a focused provider triage encounter, findings are as follows:    Brief history of present illness:  89y/o F presents to the ED for a fall. Unable to tell me how she fell. Soft C-collar on prior to arrival    Vitals:    04/19/25 1033   BP: (!) 116/40   Pulse: (!) 55   Resp: 18   Temp: 97.8 °F (36.6 °C)   TempSrc: Oral   SpO2: 97%   Weight: 49.9 kg (110 lb)   Height: 5' 5" (1.651 m)       Pertinent physical exam:   Awake in triage    Brief workup plan:   Imaging     Preliminary workup initiated; this workup will be continued and followed by the physician or advanced practice provider that is assigned to the patient when roomed.  "

## 2025-04-19 NOTE — ED NOTES
Pt's son found pt on floor this morning. He estimates approximately 1 hour. Bruising noted to left 4th digit, left lower extremity.left ankle swelling. Scattered bruising to bilat arms

## 2025-04-19 NOTE — H&P
[facility] Medicine History & Physical Examination       Patient Name: Heidy Gay  MRN: [id number]  Patient Class: IP- Inpatient   Admission Date: 4/19/2025   Admitting Physician: Vincenzo Hunter MD   Length of Stay: 0  Attending Physician: Vincenzo Hunter MD   Primary Care Provider: Jay Arroyo MD  Face-to-Face encounter date: 04/19/2025  Code Status: DNR  Chief Complaint: Fall (Baseline gcs 14, trip and fall, did not hit head. Abrasion to left FA and left calf. No thinners. )        Patient information was obtained from patient, patient's family, past medical records and ER records.     HISTORY OF PRESENT ILLNESS:   Heidy Gay is a 89 y.o. White female with a past medical history of hypertension, hyperlipidemia, coronary artery disease status post stent, hypothyroidism, gout. The patient presented to [facility] on 4/19/2025 following a trip resulting in a fal this morning (04/19/2025).  Patient lives at home alone and was found by her son lying on the floor.  She states she is going to the bathroom at 2:00 AM when she slipped landing on her back. Her head was under the bed. She is unsure if she hit her head.  For the last few days patient has been having to ambulate with a walker.  Son at bedside reports she has been having balance issues resulting in her using a walker.  Patient has had a decreased appetite recently.  Patient's family put her in a soft cervical collar due to complaints of neck pain since 04/15/2025 which son describes as a Crick in her neck.  Patient reports pain when looking laterally since neck pain onset.  Patient lives alone but has a son who frequently checks on her and brings her food.    Upon presentation to the ED, temperature 97.8° F, heart rate 55, blood pressure 116/40, respiratory rate 18 and SpO2 97% on room air.  Labs significant for WBC 14.2, H&H 10.6/33, MCV 88.5, , troponin less than 0.01.  EKG with undetermined rhythm,  left axis deviation and left bundle-branch block.  CT of the head with no acute intracranial findings.  CT cervical spine with no acute fracture or malalignment.  X-ray of the pelvis with no acute findings.  Chest x-ray with no acute findings.  In ED patient received IV fluid hydration.  Son report he does not feel comfortable with patient going back home as she lives alone and is interested in placement.  Patient is admitted to hospital medicine services for further medical management.        PAST MEDICAL HISTORY:     Past Medical History:   Diagnosis Date    Coronary artery disease     Gout, unspecified     Hypertension     Thyroid disease        PAST SURGICAL HISTORY:     Past Surgical History:   Procedure Laterality Date    CORONARY ANGIOPLASTY WITH STENT PLACEMENT         ALLERGIES:   Nsaids (non-steroidal anti-inflammatory drug) and Sulfa (sulfonamide antibiotics)    FAMILY HISTORY:   Reviewed and negative    SOCIAL HISTORY:   Denies tobacco, alcohol or illicit drug use    Screening for Social Drivers for health:  Patient screened for food insecurity, housing instability, transportation needs, utility difficulties, and interpersonal safety (select all that apply as identified as concern)  []Housing or Food  []Transportation Needs  []Utility Difficulties  []Interpersonal safety  [x]None    HOME MEDICATIONS:     Prior to Admission medications    Medication Sig Start Date End Date Taking? Authorizing Provider   allopurinoL (ZYLOPRIM) 100 MG tablet Take 1 tablet (100 mg total) by mouth once daily. 10/20/22  Yes Ophelia Olivas FNP   amLODIPine (NORVASC) 10 MG tablet Take 1 tablet (10 mg total) by mouth once daily. 10/20/22 4/19/25 Yes Ophelia Olivas FNP   levothyroxine (SYNTHROID) 25 MCG tablet Take 1 tablet (25 mcg total) by mouth before breakfast. 10/21/22 4/19/25 Yes Ophelia Olivas FNP   methenamine (HIPREX) 1 gram Tab Take 1 g by mouth 2 (two) times daily.   Yes Provider, Historical   pravastatin  (PRAVACHOL) 40 MG tablet Take 1 tablet (40 mg total) by mouth once daily. 10/20/22  Yes Ophelia Olivas FNP   tamsulosin (FLOMAX) 0.4 mg Cap Take 1 capsule (0.4 mg total) by mouth every evening. 10/20/22 4/19/25 Yes Ophelia Olivas FNP   meclizine (ANTIVERT) 25 mg tablet Take 1 tablet (25 mg total) by mouth 3 (three) times daily as needed for Dizziness. 10/20/22   Ophelia Olivas FNP       REVIEW OF SYSTEMS:   Except as documented, all other systems reviewed and negative     PHYSICAL EXAM:     VITAL SIGNS: 24 HRS MIN & MAX LAST   Temp  Min: 97.8 °F (36.6 °C)  Max: 97.8 °F (36.6 °C) 97.8 °F (36.6 °C)   BP  Min: 116/40  Max: 137/70 126/64   Pulse  Min: 55  Max: 105  102   Resp  Min: 15  Max: 20 17   SpO2  Min: 94 %  Max: 99 % (!) 94 %       General appearance:  Cachectic female in no apparent distress.  Son at bedside.  HEENT: Atraumatic head. Moist mucous membranes of oral cavity.  Lungs: Clear to auscultation bilaterally.   Heart: Regular rate and rhythm.   Abdomen: Soft, non-distended, non-tender. Bowel sounds are normal.   Extremities: No cyanosis, clubbing.  Skin tear to left elbow and left lower extremity which is wrapped.  Ecchymosis to left finger and elbow.  Skin: No Rash. Warm and dry.  Neuro: Awake, alert and oriented to place and year.  Not oriented to month.  Motor and sensory exams grossly intact.  Psych/mental status:  Pleasant.  Appropriate mood and affect. Cooperative. Responds appropriately to questions.       LABS AND IMAGING:     Recent Labs   Lab 04/19/25  1428   WBC 14.20*   RBC 3.73*   HGB 10.6*   HCT 33.0*   MCV 88.5   MCH 28.4   MCHC 32.1*   RDW 14.7      MPV 10.3       Recent Labs   Lab 04/19/25  1428      K 4.5      CO2 24   BUN 20.6*   CREATININE 0.69   CALCIUM 10.3*   ALBUMIN 3.6   ALKPHOS 76   ALT 10   AST 19   BILITOT 0.6       Microbiology Results (last 7 days)       ** No results found for the last 168 hours. **             X-Ray Pelvis Routine  AP  Narrative: EXAMINATION:  XR PELVIS ROUTINE AP    CLINICAL HISTORY:  Pain, unspecifiedfall.    COMPARISON:  28 April 2021    FINDINGS:  Frontal image of the pelvis.  There is no acute fracture or dislocation.  There is right hip arthroplasty.  Severe degenerative changes of the left hip.  Impression: No acute findings.    Electronically signed by: Jarret Nelson  Date:    04/19/2025  Time:    16:23  X-Ray Chest AP Portable  Narrative: EXAMINATION:  XR CHEST AP PORTABLE    CLINICAL HISTORY:  Unspecified fall, initial encounter    COMPARISON:  28 April 2021    FINDINGS:  Frontal view of the chest was obtained. Heart is not significantly enlarged.  There is aortic atherosclerosis.  Grossly clear lungs.  No pneumothorax.  Impression: No acute findings.    Electronically signed by: Jarret Nelson  Date:    04/19/2025  Time:    16:14  CT Cervical Spine Without Contrast  Narrative: EXAMINATION:  CT CERVICAL SPINE WITHOUT CONTRAST    CLINICAL HISTORY:  Trip and fall    TECHNIQUE:  Multidetector axial images were performed of the cervical spine without and.  Images were reconstructed.    Automated exposure control was utilized to minimize radiation dose.  DLP 1159 mGy cm..    COMPARISON:  None available.    FINDINGS:  Cervical vertebrae stature is maintained.  There is grade 1 anterolisthesis of C3 on C4.  No acute fracture or malalignment identified.  There are degenerative changes with ventral osteophyte ridging indenting the ventral thecal sac at C5-C6 and at C6-C7.  There is moderate right and mild narrowing of the left neural foramen at C7-T1.  There is no prevertebral soft tissue prominence.    This study does not exclude the possibility of intrathecal soft tissue, ligamentous or vascular injury.  Impression: No acute fracture or malalignment identified.    Electronically signed by: Shon Fletcher  Date:    04/19/2025  Time:    12:27  CT Head Without Contrast  Narrative: EXAMINATION:  CT HEAD WITHOUT CONTRAST    CLINICAL  HISTORY:  Fall.  Abrasion    TECHNIQUE:  Sequential axial images were performed of the brain without contrast.    Dose product length of 1159 mGycm. Automated exposure control was utilized to minimize radiation dose.    COMPARISON:  October 3, 2022.    FINDINGS:  There is no intracranial mass effect, midline shift, hydrocephalus or hemorrhage. There is no sulcal effacement or low attenuation changes to suggest recent large vessel territory infarction. Chronic appearing periventricular and subcortical white matter low attenuation changes are present and are consistent with chronic microangiopathic ischemia. The ventricular system and sulcal markings prominence is consistent with atrophy. There is no acute extra axial fluid collection.  There is no acute depressed skull fracture visualized paranasal sinuses are clear without mucosal thickening, polypoidal abnormality or air-fluid levels. Mastoid air cells aeration is optimal.  Impression: No acute intracranial findings identified.    Electronically signed by: Shon Fletcher  Date:    04/19/2025  Time:    12:21        ASSESSMENT & PLAN:   Assessment:  Fall, wanting nursing home placement  Normocytic anemia, stable   Leukocytosis   History of hypertension, hyperlipidemia, coronary artery disease status post stent, hypothyroidism, gout    Plan:  - Physical and occupational therapy   - Case management consult placed for nursing home placement   - UA and blood cultures ordered for leukocytosis   - IV fluid hydration  - Resume appropriate home medications when deemed necessary   - Labs in AM      VTE Prophylaxis: will be placed on Lovenox for DVT prophylaxis and will be advised to be as mobile as possible and sit in a chair as tolerated      __________________________________________________________________________  INPATIENT LIST OF MEDICATIONS   Current Medications[1]      Scheduled Meds:   enoxparin  40 mg Subcutaneous Daily    lactated ringers  1,000 mL Intravenous ED 1  Time     Continuous Infusions:  PRN Meds:.  Current Facility-Administered Medications:     acetaminophen, 650 mg, Oral, Q8H PRN    acetaminophen, 650 mg, Oral, Q4H PRN    dextrose 50%, 12.5 g, Intravenous, PRN    dextrose 50%, 25 g, Intravenous, PRN    glucagon (human recombinant), 1 mg, Intramuscular, PRN    glucose, 16 g, Oral, PRN    glucose, 24 g, Oral, PRN    ondansetron, 4 mg, Intravenous, Q4H PRN    sodium chloride 0.9%, 10 mL, Intravenous, Q12H PRN      Discharge Planning and Disposition: Anticipated discharge to be determined.    Brittni VAUGHN PA, have reviewed and discussed the case with Dr. Vincenzo Hunter MD    Please see the following addendum for further assessment and plan from there attending MD.      Portion of this note is dictated using EMR integrated voice recognition software and may be subjected to voice recognition errors not corrected with proofreading. Please  for clarification if needed.       Brittni Arroyo PA-C  04/19/2025 4-19-25 dr hunter - 89 y/o female  s/p fall at home brought in by family members for evaluation and possible placements   Agre with assessment and plans doner by Ms Delphine ARIAS . PT/OT / consultation will follow.  Anuj swanson  [facility]  4-19-24       [1]   Current Facility-Administered Medications:     acetaminophen tablet 650 mg, 650 mg, Oral, Q8H PRN, Brittni Arroyo, PA-C    acetaminophen tablet 650 mg, 650 mg, Oral, Q4H PRN, Brittni Arroyo, PA-RITCHIE    dextrose 50% injection 12.5 g, 12.5 g, Intravenous, PRN, Brittni Arroyo, PA-C    dextrose 50% injection 25 g, 25 g, Intravenous, PRNCruz Kallie E., PA-RITCHIE    enoxaparin injection 40 mg, 40 mg, Subcutaneous, Daily, Brittni Arroyo PA-RITCHIE    glucagon (human recombinant) injection 1 mg, 1 mg, Intramuscular, PRN, Brittni Arroyo, PA-RITCHIE    glucose chewable tablet 16 g, 16 g, Oral, PRN, Brittni Arroyo, PA-C    glucose chewable tablet 24 g, 24 g, Oral, PRN, Brittni Arroyo.,  PA-RITCHIE    lactated ringers bolus 1,000 mL, 1,000 mL, Intravenous, ED 1 Time, Rosalinda Thomaosn MD    ondansetron injection 4 mg, 4 mg, Intravenous, Q4H PRN, Brittni Arroyo PA-C    sodium chloride 0.9% flush 10 mL, 10 mL, Intravenous, Q12H PRN, Brittni Arroyo, KATINA    Current Outpatient Medications:     allopurinoL (ZYLOPRIM) 100 MG tablet, Take 1 tablet (100 mg total) by mouth once daily., Disp: 30 tablet, Rfl: 0    amLODIPine (NORVASC) 10 MG tablet, Take 1 tablet (10 mg total) by mouth once daily., Disp: 30 tablet, Rfl: 0    levothyroxine (SYNTHROID) 25 MCG tablet, Take 1 tablet (25 mcg total) by mouth before breakfast., Disp: 30 tablet, Rfl: 0    methenamine (HIPREX) 1 gram Tab, Take 1 g by mouth 2 (two) times daily., Disp: , Rfl:     pravastatin (PRAVACHOL) 40 MG tablet, Take 1 tablet (40 mg total) by mouth once daily., Disp: 30 tablet, Rfl: 0    tamsulosin (FLOMAX) 0.4 mg Cap, Take 1 capsule (0.4 mg total) by mouth every evening., Disp: 30 capsule, Rfl: 0    meclizine (ANTIVERT) 25 mg tablet, Take 1 tablet (25 mg total) by mouth 3 (three) times daily as needed for Dizziness., Disp: 20 tablet, Rfl: 0

## 2025-04-20 LAB
ALBUMIN SERPL-MCNC: 2.8 G/DL (ref 3.4–4.8)
ALBUMIN/GLOB SERPL: 1 RATIO (ref 1.1–2)
ALP SERPL-CCNC: 63 UNIT/L (ref 40–150)
ALT SERPL-CCNC: 10 UNIT/L (ref 0–55)
ANION GAP SERPL CALC-SCNC: 7 MEQ/L
AST SERPL-CCNC: 19 UNIT/L (ref 11–45)
BASOPHILS # BLD AUTO: 0.02 X10(3)/MCL
BASOPHILS NFR BLD AUTO: 0.3 %
BILIRUB SERPL-MCNC: 0.5 MG/DL
BUN SERPL-MCNC: 11.5 MG/DL (ref 9.8–20.1)
CALCIUM SERPL-MCNC: 9.1 MG/DL (ref 8.4–10.2)
CHLORIDE SERPL-SCNC: 111 MMOL/L (ref 98–111)
CO2 SERPL-SCNC: 26 MMOL/L (ref 23–31)
CREAT SERPL-MCNC: 0.52 MG/DL (ref 0.55–1.02)
CREAT/UREA NIT SERPL: 22
EOSINOPHIL # BLD AUTO: 0.03 X10(3)/MCL (ref 0–0.9)
EOSINOPHIL NFR BLD AUTO: 0.4 %
ERYTHROCYTE [DISTWIDTH] IN BLOOD BY AUTOMATED COUNT: 14.9 % (ref 11.5–17)
GFR SERPLBLD CREATININE-BSD FMLA CKD-EPI: >60 ML/MIN/1.73/M2
GLOBULIN SER-MCNC: 2.9 GM/DL (ref 2.4–3.5)
GLUCOSE SERPL-MCNC: 75 MG/DL (ref 75–121)
HCT VFR BLD AUTO: 29.6 % (ref 37–47)
HGB BLD-MCNC: 9.2 G/DL (ref 12–16)
IMM GRANULOCYTES # BLD AUTO: 0.03 X10(3)/MCL (ref 0–0.04)
IMM GRANULOCYTES NFR BLD AUTO: 0.4 %
LYMPHOCYTES # BLD AUTO: 0.91 X10(3)/MCL (ref 0.6–4.6)
LYMPHOCYTES NFR BLD AUTO: 13.1 %
MCH RBC QN AUTO: 27.5 PG (ref 27–31)
MCHC RBC AUTO-ENTMCNC: 31.1 G/DL (ref 33–36)
MCV RBC AUTO: 88.4 FL (ref 80–94)
MONOCYTES # BLD AUTO: 0.46 X10(3)/MCL (ref 0.1–1.3)
MONOCYTES NFR BLD AUTO: 6.6 %
NEUTROPHILS # BLD AUTO: 5.49 X10(3)/MCL (ref 2.1–9.2)
NEUTROPHILS NFR BLD AUTO: 79.2 %
NRBC BLD AUTO-RTO: 0 %
OHS QRS DURATION: 130 MS
OHS QTC CALCULATION: 526 MS
PLATELET # BLD AUTO: 322 X10(3)/MCL (ref 130–400)
PMV BLD AUTO: 9.3 FL (ref 7.4–10.4)
POTASSIUM SERPL-SCNC: 3.4 MMOL/L (ref 3.5–5.1)
PROT SERPL-MCNC: 5.7 GM/DL (ref 5.8–7.6)
RBC # BLD AUTO: 3.35 X10(6)/MCL (ref 4.2–5.4)
SODIUM SERPL-SCNC: 144 MMOL/L (ref 136–145)
WBC # BLD AUTO: 6.94 X10(3)/MCL (ref 4.5–11.5)

## 2025-04-20 PROCEDURE — 85025 COMPLETE CBC W/AUTO DIFF WBC: CPT | Performed by: PHYSICIAN ASSISTANT

## 2025-04-20 PROCEDURE — 36415 COLL VENOUS BLD VENIPUNCTURE: CPT | Performed by: PHYSICIAN ASSISTANT

## 2025-04-20 PROCEDURE — 11000001 HC ACUTE MED/SURG PRIVATE ROOM

## 2025-04-20 PROCEDURE — 63600175 PHARM REV CODE 636 W HCPCS: Performed by: PHYSICIAN ASSISTANT

## 2025-04-20 PROCEDURE — 25000003 PHARM REV CODE 250: Performed by: PHYSICIAN ASSISTANT

## 2025-04-20 PROCEDURE — 97161 PT EVAL LOW COMPLEX 20 MIN: CPT

## 2025-04-20 PROCEDURE — 80053 COMPREHEN METABOLIC PANEL: CPT | Performed by: PHYSICIAN ASSISTANT

## 2025-04-20 RX ADMIN — ENOXAPARIN SODIUM 40 MG: 40 INJECTION SUBCUTANEOUS at 05:04

## 2025-04-20 RX ADMIN — ACETAMINOPHEN 650 MG: 325 TABLET ORAL at 09:04

## 2025-04-20 NOTE — PLAN OF CARE
Problem: Physical Therapy  Goal: Physical Therapy Goal  Description: Pt will be seen for the following goals  1. Pt will be ind with bed mobility  2. Pt will be mod ind with transfers with rw  3. Pt will be mod ind with gait with a rw 200ft  Outcome: Progressing

## 2025-04-20 NOTE — PT/OT/SLP EVAL
Physical Therapy Evaluation    Patient Name:  Heidy Gay   MRN:  [id number]    Recommendations:     Discharge therapy intensity: Moderate Intensity Therapy   Discharge Equipment Recommendations: to be determined by next level of care   Barriers to discharge: None    Assessment:     Heidy Gay is a 89 y.o. female admitted with a medical diagnosis of recent fall and increased difficulty walking.  She presents with the following impairments/functional limitations: impaired endurance, impaired functional mobility, gait instability, impaired self care skills, impaired balance, decreased safety awareness     Rehab Prognosis: Good; patient would benefit from acute skilled PT services to address these deficits and reach maximum level of function.    Recent Surgery: * No surgery found *      Plan:     During this hospitalization, patient would benefit from acute PT services 5 x/week to address the identified rehab impairments via gait training, therapeutic activities, therapeutic exercises and progress toward the following goals:    Plan of Care Expires:  05/09/25    Subjective     Chief Complaint:   Patient/Family Comments/goals:   Pain/Comfort:       Patients cultural, spiritual, Druze conflicts given the current situation:      Living Environment:  Pt lives alone in house with no steps  Prior to admission, patients level of function was ind.  Equipment used at home: walker, rolling.  DME owned (not currently used): .  Upon discharge, patient will have assistance from tbd.    Objective:     Communicated with nurse prior to session.  Patient found supine with Pure[employer]ick  upon PT entry to room.    General Precautions: Standard, fall  Orthopedic Precautions:N/A   Braces: N/A  Respiratory Status: Room air  Blood Pressure:       Exams:  RLE ROM: WFL  RLE Strength: WFL  LLE ROM: WFL  LLE Strength: WFL  Skin integrity: Visible skin intact      Functional Mobility:  Bed Mobility:     Scooting: moderate  assistance  Supine to Sit: stand by assistance  Sit to Supine: minimum assistance  Transfers:     Sit to Stand:  contact guard assistance with rolling walker  Bed to Chair: contact guard assistance with  rolling walker  using  Step Transfer  Gait: pt ambulated 150ft with rw cga      AM-PAC 6 CLICK MOBILITY  Total Score:        Treatment & Education:      Patient provided with verbal education education regarding PT role/goals/POC, fall prevention, and safety awareness.  Understanding was verbalized, however additional teaching warranted.     Patient left supine with all lines intact and call button in reach.    GOALS:   Multidisciplinary Problems       Physical Therapy Goals          Problem: Physical Therapy    Goal Priority Disciplines Outcome Interventions   Physical Therapy Goal     PT, PT/OT Progressing    Description: Pt will be seen for the following goals  1. Pt will be ind with bed mobility  2. Pt will be mod ind with transfers with rw  3. Pt will be mod ind with gait with a rw 200ft                       History:     Past Medical History:   Diagnosis Date    Coronary artery disease     Gout, unspecified     Hypertension     Thyroid disease        Past Surgical History:   Procedure Laterality Date    CORONARY ANGIOPLASTY WITH STENT PLACEMENT         Time Tracking:     PT Received On:    PT Start Time: 1240     PT Stop Time: 1300  PT Total Time (min): 20 min     Billable Minutes: Evaluation 20 04/20/2025

## 2025-04-21 PROCEDURE — 11000001 HC ACUTE MED/SURG PRIVATE ROOM

## 2025-04-21 PROCEDURE — 25000003 PHARM REV CODE 250: Performed by: INTERNAL MEDICINE

## 2025-04-21 PROCEDURE — 63600175 PHARM REV CODE 636 W HCPCS: Performed by: PHYSICIAN ASSISTANT

## 2025-04-21 RX ORDER — LEVOTHYROXINE SODIUM 25 UG/1
25 TABLET ORAL
Status: DISCONTINUED | OUTPATIENT
Start: 2025-04-22 | End: 2025-04-24 | Stop reason: HOSPADM

## 2025-04-21 RX ORDER — TAMSULOSIN HYDROCHLORIDE 0.4 MG/1
0.4 CAPSULE ORAL NIGHTLY
Status: DISCONTINUED | OUTPATIENT
Start: 2025-04-21 | End: 2025-04-24 | Stop reason: HOSPADM

## 2025-04-21 RX ORDER — ALLOPURINOL 100 MG/1
100 TABLET ORAL DAILY
Status: DISCONTINUED | OUTPATIENT
Start: 2025-04-21 | End: 2025-04-24 | Stop reason: HOSPADM

## 2025-04-21 RX ORDER — POTASSIUM CHLORIDE 20 MEQ/1
40 TABLET, EXTENDED RELEASE ORAL 2 TIMES DAILY
Status: COMPLETED | OUTPATIENT
Start: 2025-04-21 | End: 2025-04-21

## 2025-04-21 RX ADMIN — ENOXAPARIN SODIUM 40 MG: 40 INJECTION SUBCUTANEOUS at 05:04

## 2025-04-21 RX ADMIN — POTASSIUM CHLORIDE 40 MEQ: 1500 TABLET, EXTENDED RELEASE ORAL at 10:04

## 2025-04-21 RX ADMIN — POTASSIUM CHLORIDE 40 MEQ: 1500 TABLET, EXTENDED RELEASE ORAL at 07:04

## 2025-04-21 RX ADMIN — TAMSULOSIN HYDROCHLORIDE 0.4 MG: 0.4 CAPSULE ORAL at 07:04

## 2025-04-21 RX ADMIN — ALLOPURINOL 100 MG: 100 TABLET ORAL at 10:04

## 2025-04-21 NOTE — PLAN OF CARE
[facility] sent therapy notes to [facility] and [facility] for SNF to [facility] placement. Locet completed, pending 142.

## 2025-04-21 NOTE — PROGRESS NOTES
[id number]  Attempted eval this date and pt repeatedly declines any activity at this time 2/2 c/o some neck pain, spent extended time building therapeutic relationship, learning pt's history and educated pt and son  on role of therapy and plan of care/follow up for eval as tolerated. Will con't to attempt next date. .

## 2025-04-21 NOTE — PROGRESS NOTES
[facility]  Hospital Medicine Progress Note        Chief Complaint: Inpatient Follow-up for Fall (Baseline gcs 14, trip and fall, did not hit head. Abrasion to left FA and left calf. No thinners. )    HPI: Heidy Gay is a 89 y.o. White female with a past medical history of hypertension, hyperlipidemia, coronary artery disease status post stent, hypothyroidism, gout. The patient presented to [facility] on 4/19/2025 following a trip resulting in a fal this morning (04/19/2025).  Patient lives at home alone and was found by her son lying on the floor.  She states she is going to the bathroom at 2:00 AM when she slipped landing on her back. Her head was under the bed. She is unsure if she hit her head.  For the last few days patient has been having to ambulate with a walker.  Son at bedside reports she has been having balance issues resulting in her using a walker.  Patient has had a decreased appetite recently.  Patient's family put her in a soft cervical collar due to complaints of neck pain since 04/15/2025 which son describes as a Crick in her neck.  Patient reports pain when looking laterally since neck pain onset.  Patient lives alone but has a son who frequently checks on her and brings her food.     Upon presentation to the ED, temperature 97.8° F, heart rate 55, blood pressure 116/40, respiratory rate 18 and SpO2 97% on room air.  Labs significant for WBC 14.2, H&H 10.6/33, MCV 88.5, , troponin less than 0.01.  EKG with undetermined rhythm, left axis deviation and left bundle-branch block.  CT of the head with no acute intracranial findings.  CT cervical spine with no acute fracture or malalignment.  X-ray of the pelvis with no acute findings.  Chest x-ray with no acute findings.  In ED patient received IV fluid hydration.  Son report he does not feel comfortable with patient going back home as she lives alone and is interested in placement.  Patient is admitted to hospital  medicine services for further medical management.    Interval Hx:     4/20/25 dr asad sow lady ever. No complains .  to work on nursing home placement . Pt is ok w placement .     Case was discussed with patient's nurse and  on the floor.    Objective/physical exam:  General appearance:  Cachectic female in no apparent distress.  Son at bedside.  HEENT: Atraumatic head. Moist mucous membranes of oral cavity.  Lungs: Clear to auscultation bilaterally.   Heart: Regular rate and rhythm.   Abdomen: Soft, non-distended, non-tender. Bowel sounds are normal.   Extremities: No cyanosis, clubbing.  Skin tear to left elbow and left lower extremity which is wrapped.  Ecchymosis to left finger and elbow.  Skin: No Rash. Warm and dry.  Neuro: Awake, alert and oriented to place and year.  Not oriented to month.  Motor and sensory exams grossly intact.  Psych/mental status:  Pleasant.  Appropriate mood and affect. Cooperative. Responds appropriately to questions.     VITAL SIGNS: 24 HRS MIN & MAX LAST   Temp  Min: 97.4 °F (36.3 °C)  Max: 98.5 °F (36.9 °C) 98.5 °F (36.9 °C)   BP  Min: 130/62  Max: 144/52 131/71   Pulse  Min: 87  Max: 99  96   Resp  Min: 18  Max: 18 18   SpO2  Min: 93 %  Max: 96 % 95 %     I have reviewed the following labs:  Recent Labs   Lab 04/19/25  1428 04/20/25  0415   WBC 14.20* 6.94   RBC 3.73* 3.35*   HGB 10.6* 9.2*   HCT 33.0* 29.6*   MCV 88.5 88.4   MCH 28.4 27.5   MCHC 32.1* 31.1*   RDW 14.7 14.9    322   MPV 10.3 9.3     Recent Labs   Lab 04/19/25  1428 04/20/25  0415    144   K 4.5 3.4*    111   CO2 24 26   BUN 20.6* 11.5   CREATININE 0.69 0.52*   CALCIUM 10.3* 9.1   ALBUMIN 3.6 2.8*   ALKPHOS 76 63   ALT 10 10   AST 19 19   BILITOT 0.6 0.5     Microbiology Results (last 7 days)       Procedure Component Value Units Date/Time    Blood Culture [[id number]]  (Normal) Collected: 04/19/25 5520    Order Status: Completed Specimen: Blood, Venous Updated:  04/20/25 1902     Blood Culture No Growth At 24 Hours    Blood Culture [[id number]]  (Normal) Collected: 04/19/25 1752    Order Status: Completed Specimen: Blood, Venous Updated: 04/20/25 1902     Blood Culture No Growth At 24 Hours             See below for Radiology    Assessment/Plan:  Fall, wanting nursing home placement  Normocytic anemia, stable   Leukocytosis   History of hypertension, hyperlipidemia, coronary artery disease status post stent, hypothyroidism, gout      Plan -  to assist with nursing home placement . Moderate intensity therapy by PT. Pt has no complains . Alert /active and oriented .    VTE prophylaxis:     Patient condition:  Stable    Anticipated discharge and Disposition:   tbd      All diagnosis and differential diagnosis have been reviewed; assessment and plan has been documented; I have personally reviewed the labs and test results that are presently available; I have reviewed the patients medication list; I have reviewed the consulting providers response and recommendations. I have reviewed or attempted to review medical records based upon their availability    All of the patient's questions have been  addressed and answered. Patient's is agreeable to the above stated plan. I will continue to monitor closely and make adjustments to medical management as needed.    Portions of this note dictated using EMR integrated voice recognition software, and may be subject to voice recognition errors not corrected at proofreading. Please contact writer for clarification if needed.   _____________________________________________________________________    Malnutrition Status:  Nutrition consulted. Most recent weight and BMI monitored-     Measurements:  Wt Readings from Last 1 Encounters:   04/20/25 49.9 kg (110 lb 0.2 oz)   Body mass index is 18.31 kg/m².    Patient has been screened and assessed by RD.    Malnutrition Type:  Context:    Level:      Malnutrition Characteristic  Summary:       Interventions/Recommendations (treatment strategy):        Scheduled Med:   enoxparin  40 mg Subcutaneous Daily      Continuous Infusions:     PRN Meds:    Current Facility-Administered Medications:     acetaminophen, 650 mg, Oral, Q8H PRN    acetaminophen, 650 mg, Oral, Q4H PRN    dextrose 50%, 12.5 g, Intravenous, PRN    dextrose 50%, 25 g, Intravenous, PRN    glucagon (human recombinant), 1 mg, Intramuscular, PRN    glucose, 16 g, Oral, PRN    glucose, 24 g, Oral, PRN    ondansetron, 4 mg, Intravenous, Q4H PRN    sodium chloride 0.9%, 10 mL, Intravenous, Q12H PRN     Radiology:  I have personally reviewed the following imaging and agree with the radiologist.     X-Ray Pelvis Routine AP  Narrative: EXAMINATION:  XR PELVIS ROUTINE AP    CLINICAL HISTORY:  Pain, unspecifiedfall.    COMPARISON:  28 April 2021    FINDINGS:  Frontal image of the pelvis.  There is no acute fracture or dislocation.  There is right hip arthroplasty.  Severe degenerative changes of the left hip.  Impression: No acute findings.    Electronically signed by: Jarret Nelson  Date:    04/19/2025  Time:    16:23  X-Ray Chest AP Portable  Narrative: EXAMINATION:  XR CHEST AP PORTABLE    CLINICAL HISTORY:  Unspecified fall, initial encounter    COMPARISON:  28 April 2021    FINDINGS:  Frontal view of the chest was obtained. Heart is not significantly enlarged.  There is aortic atherosclerosis.  Grossly clear lungs.  No pneumothorax.  Impression: No acute findings.    Electronically signed by: Jarret Nelson  Date:    04/19/2025  Time:    16:14  CT Cervical Spine Without Contrast  Narrative: EXAMINATION:  CT CERVICAL SPINE WITHOUT CONTRAST    CLINICAL HISTORY:  Trip and fall    TECHNIQUE:  Multidetector axial images were performed of the cervical spine without and.  Images were reconstructed.    Automated exposure control was utilized to minimize radiation dose.  DLP 1159 mGy cm..    COMPARISON:  None available.    FINDINGS:  Cervical  vertebrae stature is maintained.  There is grade 1 anterolisthesis of C3 on C4.  No acute fracture or malalignment identified.  There are degenerative changes with ventral osteophyte ridging indenting the ventral thecal sac at C5-C6 and at C6-C7.  There is moderate right and mild narrowing of the left neural foramen at C7-T1.  There is no prevertebral soft tissue prominence.    This study does not exclude the possibility of intrathecal soft tissue, ligamentous or vascular injury.  Impression: No acute fracture or malalignment identified.    Electronically signed by: Shon Fletcher  Date:    04/19/2025  Time:    12:27  CT Head Without Contrast  Narrative: EXAMINATION:  CT HEAD WITHOUT CONTRAST    CLINICAL HISTORY:  Fall.  Abrasion    TECHNIQUE:  Sequential axial images were performed of the brain without contrast.    Dose product length of 1159 mGycm. Automated exposure control was utilized to minimize radiation dose.    COMPARISON:  October 3, 2022.    FINDINGS:  There is no intracranial mass effect, midline shift, hydrocephalus or hemorrhage. There is no sulcal effacement or low attenuation changes to suggest recent large vessel territory infarction. Chronic appearing periventricular and subcortical white matter low attenuation changes are present and are consistent with chronic microangiopathic ischemia. The ventricular system and sulcal markings prominence is consistent with atrophy. There is no acute extra axial fluid collection.  There is no acute depressed skull fracture visualized paranasal sinuses are clear without mucosal thickening, polypoidal abnormality or air-fluid levels. Mastoid air cells aeration is optimal.  Impression: No acute intracranial findings identified.    Electronically signed by: Shon Fletcher  Date:    04/19/2025  Time:    12:21      Vincenzo Hunter MD  Department of Hospital Medicine   [facility]   04/21/2025

## 2025-04-21 NOTE — PLAN OF CARE
Problem: Adult Inpatient Plan of Care  Goal: Plan of Care Review  4/20/2025 2341 by Lucas Duong RN  Outcome: Progressing  4/20/2025 2317 by Lucas Duong RN  Outcome: Progressing  Goal: Patient-Specific Goal (Individualized)  4/20/2025 2341 by Lucas Duong RN  Outcome: Progressing  4/20/2025 2317 by Lucas Duong RN  Outcome: Progressing  Goal: Absence of Hospital-Acquired Illness or Injury  4/20/2025 2341 by Lucas Duong RN  Outcome: Progressing  4/20/2025 2317 by Lucas Duong RN  Outcome: Progressing  Goal: Optimal Comfort and Wellbeing  4/20/2025 2341 by Lucas Duong RN  Outcome: Progressing  4/20/2025 2317 by Lucas Duong RN  Outcome: Progressing  Goal: Readiness for Transition of Care  4/20/2025 2341 by Lucas Duong RN  Outcome: Progressing  4/20/2025 2317 by Lucas Duong RN  Outcome: Progressing     Problem: Skin Injury Risk Increased  Goal: Skin Health and Integrity  4/20/2025 2341 by Lucas Duong RN  Outcome: Progressing  4/20/2025 2317 by Lucas Duong RN  Outcome: Progressing     Problem: Fall Injury Risk  Goal: Absence of Fall and Fall-Related Injury  4/20/2025 2341 by Lucas Duong RN  Outcome: Progressing  4/20/2025 2317 by Lucas Duong RN  Outcome: Progressing

## 2025-04-21 NOTE — PROGRESS NOTES
[facility]  Hospital Medicine Progress Note        Chief Complaint: Inpatient Follow-up for Fall (Baseline gcs 14, trip and fall, did not hit head. Abrasion to left FA and left calf. No thinners. )    HPI: Heidy Gay is a 89 y.o. White female with a past medical history of hypertension, hyperlipidemia, coronary artery disease status post stent, hypothyroidism, gout. The patient presented to [facility] on 4/19/2025 following a trip resulting in a fal this morning (04/19/2025).  Patient lives at home alone and was found by her son lying on the floor.  She states she is going to the bathroom at 2:00 AM when she slipped landing on her back. Her head was under the bed. She is unsure if she hit her head.  For the last few days patient has been having to ambulate with a walker.  Son at bedside reports she has been having balance issues resulting in her using a walker.  Patient has had a decreased appetite recently.  Patient's family put her in a soft cervical collar due to complaints of neck pain since 04/15/2025 which son describes as a Crick in her neck.  Patient reports pain when looking laterally since neck pain onset.  Patient lives alone but has a son who frequently checks on her and brings her food.     Upon presentation to the ED, temperature 97.8° F, heart rate 55, blood pressure 116/40, respiratory rate 18 and SpO2 97% on room air.  Labs significant for WBC 14.2, H&H 10.6/33, MCV 88.5, , troponin less than 0.01.  EKG with undetermined rhythm, left axis deviation and left bundle-branch block.  CT of the head with no acute intracranial findings.  CT cervical spine with no acute fracture or malalignment.  X-ray of the pelvis with no acute findings.  Chest x-ray with no acute findings.  In ED patient received IV fluid hydration.  Son report he does not feel comfortable with patient going back home as she lives alone and is interested in placement.  Patient is admitted to hospital  medicine services for further medical management.    Interval Hx:     4/20/25 dr kamara - amgi lady ever. No complains .  to work on nursing home placement . Pt is ok w placement .     4/21/25 dr kamara-  spoke with pt and son . Placement in progress    Case was discussed with patient's nurse and  on the floor.    Objective/physical exam:  General appearance:  Cachectic female in no apparent distress.  Son at bedside.  HEENT: Atraumatic head. Moist mucous membranes of oral cavity.  Lungs: Clear to auscultation bilaterally.   Heart: Regular rate and rhythm.   Abdomen: Soft, non-distended, non-tender. Bowel sounds are normal.   Extremities: No cyanosis, clubbing.  Skin tear to left elbow and left lower extremity which is wrapped.  Ecchymosis to left finger and elbow.  Skin: No Rash. Warm and dry.  Neuro: Awake, alert and oriented to place and year.  Not oriented to month.  Motor and sensory exams grossly intact.  Psych/mental status:  Pleasant.  Appropriate mood and affect. Cooperative. Responds appropriately to questions.     VITAL SIGNS: 24 HRS MIN & MAX LAST   Temp  Min: 97.4 °F (36.3 °C)  Max: 98.5 °F (36.9 °C) 98.5 °F (36.9 °C)   BP  Min: 130/62  Max: 144/52 131/71   Pulse  Min: 87  Max: 99  96   Resp  Min: 18  Max: 18 18   SpO2  Min: 93 %  Max: 96 % 95 %     I have reviewed the following labs:  Recent Labs   Lab 04/19/25  1428 04/20/25  0415   WBC 14.20* 6.94   RBC 3.73* 3.35*   HGB 10.6* 9.2*   HCT 33.0* 29.6*   MCV 88.5 88.4   MCH 28.4 27.5   MCHC 32.1* 31.1*   RDW 14.7 14.9    322   MPV 10.3 9.3     Recent Labs   Lab 04/19/25  1428 04/20/25  0415    144   K 4.5 3.4*    111   CO2 24 26   BUN 20.6* 11.5   CREATININE 0.69 0.52*   CALCIUM 10.3* 9.1   ALBUMIN 3.6 2.8*   ALKPHOS 76 63   ALT 10 10   AST 19 19   BILITOT 0.6 0.5     Microbiology Results (last 7 days)       Procedure Component Value Units Date/Time    Blood Culture [[id number]]  (Normal)  Collected: 04/19/25 1751    Order Status: Completed Specimen: Blood, Venous Updated: 04/20/25 1902     Blood Culture No Growth At 24 Hours    Blood Culture [[id number]]  (Normal) Collected: 04/19/25 1752    Order Status: Completed Specimen: Blood, Venous Updated: 04/20/25 1902     Blood Culture No Growth At 24 Hours             See below for Radiology    Assessment/Plan:  Fall, wanting nursing home placement  Normocytic anemia, stable   Leukocytosis - resolved  History of hypertension, hyperlipidemia, coronary artery disease status post stent, hypothyroidism, gout      Plan -  working on placement. Moderate intensity therapy by PT. Pt has no complains . Alert /active and oriented .    VTE prophylaxis:     Patient condition:  Stable    Anticipated discharge and Disposition:   tbd      All diagnosis and differential diagnosis have been reviewed; assessment and plan has been documented; I have personally reviewed the labs and test results that are presently available; I have reviewed the patients medication list; I have reviewed the consulting providers response and recommendations. I have reviewed or attempted to review medical records based upon their availability    All of the patient's questions have been  addressed and answered. Patient's is agreeable to the above stated plan. I will continue to monitor closely and make adjustments to medical management as needed.    Portions of this note dictated using EMR integrated voice recognition software, and may be subject to voice recognition errors not corrected at proofreading. Please contact writer for clarification if needed.   _____________________________________________________________________    Malnutrition Status:  Nutrition consulted. Most recent weight and BMI monitored-     Measurements:  Wt Readings from Last 1 Encounters:   04/20/25 49.9 kg (110 lb 0.2 oz)   Body mass index is 18.31 kg/m².    Patient has been screened and assessed by  DEE    Malnutrition Type:  Context:    Level:      Malnutrition Characteristic Summary:       Interventions/Recommendations (treatment strategy):        Scheduled Med:   allopurinoL  100 mg Oral Daily    enoxparin  40 mg Subcutaneous Daily    [START ON 4/22/2025] levothyroxine  25 mcg Oral Before breakfast    potassium chloride  40 mEq Oral BID    tamsulosin  0.4 mg Oral QHS      Continuous Infusions:     PRN Meds:    Current Facility-Administered Medications:     acetaminophen, 650 mg, Oral, Q8H PRN    acetaminophen, 650 mg, Oral, Q4H PRN    dextrose 50%, 12.5 g, Intravenous, PRN    dextrose 50%, 25 g, Intravenous, PRN    glucagon (human recombinant), 1 mg, Intramuscular, PRN    glucose, 16 g, Oral, PRN    glucose, 24 g, Oral, PRN    ondansetron, 4 mg, Intravenous, Q4H PRN    sodium chloride 0.9%, 10 mL, Intravenous, Q12H PRN     Radiology:  I have personally reviewed the following imaging and agree with the radiologist.     X-Ray Pelvis Routine AP  Narrative: EXAMINATION:  XR PELVIS ROUTINE AP    CLINICAL HISTORY:  Pain, unspecifiedfall.    COMPARISON:  28 April 2021    FINDINGS:  Frontal image of the pelvis.  There is no acute fracture or dislocation.  There is right hip arthroplasty.  Severe degenerative changes of the left hip.  Impression: No acute findings.    Electronically signed by: Jarret Nelson  Date:    04/19/2025  Time:    16:23  X-Ray Chest AP Portable  Narrative: EXAMINATION:  XR CHEST AP PORTABLE    CLINICAL HISTORY:  Unspecified fall, initial encounter    COMPARISON:  28 April 2021    FINDINGS:  Frontal view of the chest was obtained. Heart is not significantly enlarged.  There is aortic atherosclerosis.  Grossly clear lungs.  No pneumothorax.  Impression: No acute findings.    Electronically signed by: Jarret Nelson  Date:    04/19/2025  Time:    16:14  CT Cervical Spine Without Contrast  Narrative: EXAMINATION:  CT CERVICAL SPINE WITHOUT CONTRAST    CLINICAL HISTORY:  Trip and  fall    TECHNIQUE:  Multidetector axial images were performed of the cervical spine without and.  Images were reconstructed.    Automated exposure control was utilized to minimize radiation dose.  DLP 1159 mGy cm..    COMPARISON:  None available.    FINDINGS:  Cervical vertebrae stature is maintained.  There is grade 1 anterolisthesis of C3 on C4.  No acute fracture or malalignment identified.  There are degenerative changes with ventral osteophyte ridging indenting the ventral thecal sac at C5-C6 and at C6-C7.  There is moderate right and mild narrowing of the left neural foramen at C7-T1.  There is no prevertebral soft tissue prominence.    This study does not exclude the possibility of intrathecal soft tissue, ligamentous or vascular injury.  Impression: No acute fracture or malalignment identified.    Electronically signed by: Shon Fletcher  Date:    04/19/2025  Time:    12:27  CT Head Without Contrast  Narrative: EXAMINATION:  CT HEAD WITHOUT CONTRAST    CLINICAL HISTORY:  Fall.  Abrasion    TECHNIQUE:  Sequential axial images were performed of the brain without contrast.    Dose product length of 1159 mGycm. Automated exposure control was utilized to minimize radiation dose.    COMPARISON:  October 3, 2022.    FINDINGS:  There is no intracranial mass effect, midline shift, hydrocephalus or hemorrhage. There is no sulcal effacement or low attenuation changes to suggest recent large vessel territory infarction. Chronic appearing periventricular and subcortical white matter low attenuation changes are present and are consistent with chronic microangiopathic ischemia. The ventricular system and sulcal markings prominence is consistent with atrophy. There is no acute extra axial fluid collection.  There is no acute depressed skull fracture visualized paranasal sinuses are clear without mucosal thickening, polypoidal abnormality or air-fluid levels. Mastoid air cells aeration is optimal.  Impression: No acute  intracranial findings identified.    Electronically signed by: Shon Fletcher  Date:    04/19/2025  Time:    12:21      Vincenzo Hunter MD  Department of Hospital Medicine   [facility]   04/21/2025

## 2025-04-21 NOTE — PLAN OF CARE
Patient was living alone in a single story home prior to admission. Patient's son lives 1 mile away. Discussed SNF at discharge. Patient and son are requesting NH placement. Provided SNF choice list. Will follow-up on decision    PCP: Dr. Jay Arroyo  Pharmacy: [facility] submitted in [facility]. Requested referrals to [facility] for SNF to [facility]. Notified April [facility].       04/21/25 1131   Discharge Assessment   Assessment Type Discharge Planning Assessment   Confirmed/corrected address, phone number and insurance Yes   Confirmed Demographics Correct on Facesheet   Source of Information family;patient   Reason For Admission Baseline gcs 14, trip and fall, did not hit head. Abrasion to left FA and left calf. No thinners.   People in Home alone   Do you expect to return to your current living situation? No  ([facility]   Home Accessibility wheelchair accessible   Home Layout Able to live on 1st floor   Equipment Currently Used at Home cane, straight;walker, rolling   Readmission within 30 days? No   Patient currently being followed by outpatient case management? No   Do you currently have service(s) that help you manage your care at home? No   Do you take prescription medications? Yes   Do you have prescription coverage? Yes   Coverage Medicare/C   Do you have any problems affording any of your prescribed medications? No   Is the patient taking medications as prescribed? yes   How do you get to doctors appointments? family or friend will provide   Are you on dialysis? No   Do you take coumadin? No   Discharge Plan A Skilled Nursing Facility   Discharge Plan B New Nursing Home placement - [facility] care facility   DME Needed Upon Discharge  none   Discharge Plan discussed with: Patient;Adult children   Transition of Care Barriers None

## 2025-04-22 PROCEDURE — 11000001 HC ACUTE MED/SURG PRIVATE ROOM

## 2025-04-22 PROCEDURE — 97535 SELF CARE MNGMENT TRAINING: CPT

## 2025-04-22 PROCEDURE — 97116 GAIT TRAINING THERAPY: CPT | Mod: CQ

## 2025-04-22 PROCEDURE — 63600175 PHARM REV CODE 636 W HCPCS: Performed by: PHYSICIAN ASSISTANT

## 2025-04-22 PROCEDURE — 97165 OT EVAL LOW COMPLEX 30 MIN: CPT

## 2025-04-22 PROCEDURE — 25000003 PHARM REV CODE 250: Performed by: INTERNAL MEDICINE

## 2025-04-22 RX ADMIN — ALLOPURINOL 100 MG: 100 TABLET ORAL at 09:04

## 2025-04-22 RX ADMIN — ENOXAPARIN SODIUM 40 MG: 40 INJECTION SUBCUTANEOUS at 06:04

## 2025-04-22 RX ADMIN — TAMSULOSIN HYDROCHLORIDE 0.4 MG: 0.4 CAPSULE ORAL at 09:04

## 2025-04-22 RX ADMIN — LEVOTHYROXINE SODIUM 25 MCG: 0.03 TABLET ORAL at 04:04

## 2025-04-22 NOTE — PLAN OF CARE
Clinical updates sent to [facility] and [facility] for SNF to long term placement. Will follow up with facilities following their morning meetings.     1000: Reached out to Stephani at [facility] and Allison at [facility] regarding this patient's referral. Awaiting response from both at this time. Will continue to follow up.     1042: [facility] unable to accept this patient.    1058: Stephani at [facility] stated she did not receive this referral and gave me an alternate fax number to send it too.    1132: 142 downloaded from assessment pro. [employer] and 142 uploaded into .    1147: Spoke to patient's son Rajiv to let him know [facility] is unable to accept this patient. Made him aware that we are still waiting on a decision from [facility]. He stated they would review the list again for more facilities of choice in case [facility] is unable to accept.     1157: Received a call back from patient's son Rajiv. Referrals sent to [facility] and [facility].    1234: Received a call from Azra at [facility]. She asked whether this patient was medically ready for discharge. She stated she would be calling the patient's son Rajiv regarding placement.     1237: Stephani with [facility] stated she would be by today to assess this patient.

## 2025-04-22 NOTE — CONSULTS
[facility] [address]  Wound Care    Patient Name:  Heidy Gay   MRN:  [id number]  Date: 4/22/2025  Diagnosis: <principal problem not specified>    History:     Past Medical History:   Diagnosis Date    Coronary artery disease     Gout, unspecified     Hypertension     Thyroid disease        Social History[1]    Precautions:     Allergies as of 04/19/2025 - Reviewed 04/19/2025   Allergen Reaction Noted    Nsaids (non-steroidal anti-inflammatory drug) Other (See Comments) 05/05/2021    Sulfa (sulfonamide antibiotics) Other (See Comments) 04/19/2025       WOC Assessment Details/Treatment      04/22/25 1506   WOCN Assessment   Visit Date 04/22/25   Visit Time 1506   Consult Type New   WOCN Speciality Wound   Intervention chart review;assessed;changed;applied;orders   Teaching on-going        Wound 04/22/25 1500 Skin Tear Left anterior Leg   Date First Assessed/Time First Assessed: 04/22/25 1500   Present on Original Admission: Yes  Primary Wound Type: Skin Tear  Side: Left  Orientation: anterior  Location: Leg   Wound Image    Dressing Appearance Moist drainage   Drainage Amount Scant   Drainage Characteristics/Odor Serosanguineous   Appearance Red;Pink;Moist   Tissue loss description Partial thickness   Black (%), Wound Tissue Color 0 %   Red (%), Wound Tissue Color 100 %   Yellow (%), Wound Tissue Color 0 %   Periwound Area Dry;Intact;Pink   Wound Edges Irregular   Wound Length (cm) 1.9 cm   Wound Width (cm) 0.8 cm   Wound Depth (cm) 0.1 cm   Wound Volume (cm^3) 0.08 cm^3   Wound Surface Area (cm^2) 1.19 cm^2   Care Cleansed with:;Antimicrobial agent   Dressing Applied;Other (comment)  (large foam)   WOCN consulted for left shin wound. Chart reviewed. Family at bedside. Patient reports chronic wound ; still lives at home . Ambulates with walker. Removed dressing to left lower extremity. Measurements obtained; photos taken. Treatment recommendations: apply large foam dressing. Change q2days  and prn. Orders placed.   04/22/2025         [1]   Social History  Socioeconomic History    Marital status:    Tobacco Use    Smoking status: Never    Smokeless tobacco: Never   Substance and Sexual Activity    Alcohol use: Never     Social Drivers of Health     Financial Resource Strain: Low Risk  (4/20/2025)    Overall Financial Resource Strain (CARDIA)     Difficulty of Paying Living Expenses: Not hard at all   Food Insecurity: No Food Insecurity (4/20/2025)    Hunger Vital Sign     Worried About Running Out of Food in the Last Year: Never true     Ran Out of Food in the Last Year: Never true   Transportation Needs: No Transportation Needs (4/20/2025)    PRAPARE - Transportation     Lack of Transportation (Medical): No     Lack of Transportation (Non-Medical): No   Stress: No Stress Concern Present (4/20/2025)    [employer] [place] [employer] - Occupational Stress Questionnaire     Feeling of Stress : Not at all   Housing Stability: Low Risk  (4/20/2025)    Housing Stability Vital Sign     Unable to Pay for Housing in the Last Year: No     Homeless in the Last Year: No

## 2025-04-22 NOTE — PLAN OF CARE
Problem: Occupational Therapy  Goal: Occupational Therapy Goal  Description: Goals to be met by: 5/20/25     Patient will increase functional independence with ADLs by performing:    UE Dressing with Stand-by Assistance.  LE Dressing with Stand-by Assistance.  Grooming while standing with Stand-by Assistance.  Toileting from toilet with Stand-by Assistance for hygiene and clothing management.   Toilet transfer to toilet with Stand-by Assistance.    Outcome: Progressing

## 2025-04-22 NOTE — PT/OT/SLP EVAL
"Occupational Therapy  Evaluation    Name: Heidy Gay  MRN: [id number]  Admitting Diagnosis:   1. Fall    2. Dizziness    3. Pain    4. Unable to care for self    5. Dehydration        Recent Surgery: * No surgery found *      Recommendations:     Discharge therapy intensity: Moderate Intensity Therapy   Discharge Equipment Recommendations:   (tbd)  Barriers to discharge:       Assessment:     Heidy Gay is a 89 y.o. female with a medical diagnosis of fall, dizziness. She presents with the following performance deficits affecting function: weakness, impaired endurance, impaired self care skills, impaired functional mobility, decreased safety awareness. Pt able to ambulate to BR today with CGA with RW, good participation with activity. Will f/u to prevent further decline in functional status and maximize safety and independence.     Rehab Prognosis: good; patient would benefit from acute skilled OT services to address these deficits and reach maximum level of function.       Plan:     Patient to be seen 4 x/week to address the above listed problems via self-care/home management, therapeutic activities, therapeutic exercises  Plan of Care Expires: 05/20/25  Plan of Care Reviewed with: patient    Subjective     Chief Complaint: none stated  Patient/Family Comments/goals: "I thank yall"    Occupational Profile:  Living Environment: lives alone in SS home, was mostly independent with ADL"s, son lives close and reports some decreasing memory and recall lately.   Previous level of function: independent/mod I  Roles and Routines: mom, grandmom  Equipment Used at Home: cane, straight, walker, rolling  Assistance upon Discharge:    Pain/Comfort:  Pain Rating 1: 0/10    Patients cultural, spiritual, Caodaism conflicts given the current situation:      Objective:     OT communicated with nsg and PTA prior to session.      Patient was found supine with PureWick upon OT entry to room.    General Precautions: Standard, " fall  Orthopedic Precautions:    Braces:      Vital Signs:     Bed Mobility:    Sup to sit with min assist    Functional Mobility/Transfers:  Sit to stand with min assist  Functional Mobility: ambulated to BR with min initially, progressed to cgA with RW  Toilet transfer with CGA    Activities of Daily Living:  Toilet hygiene with max assist  Clothing management with max assist, standing    AMPAC 6 Click ADL:  AMPAC Total Score:      Functional Cognition:  Follows basic commands, knows she's in the hospital    Visual Perceptual Skills:  intact    Upper Extremity Function:  Right Upper Extremity:   wfl    Left Upper Extremity:  wfl    Balance:   Good sitting EOb      Additional Treatment:  As above    Patient Education:  Patient provided with verbal education education regarding OT role/goals/POC.  Understanding was verbalized, however additional teaching warranted.     Patient left supine with call button in reach.    GOALS:   Multidisciplinary Problems       Occupational Therapy Goals          Problem: Occupational Therapy    Goal Priority Disciplines Outcome Interventions   Occupational Therapy Goal     OT, PT/OT Progressing    Description: Goals to be met by: 5/20/25     Patient will increase functional independence with ADLs by performing:    UE Dressing with Stand-by Assistance.  LE Dressing with Stand-by Assistance.  Grooming while standing with Stand-by Assistance.  Toileting from toilet with Stand-by Assistance for hygiene and clothing management.   Toilet transfer to toilet with Stand-by Assistance.                         History:     Past Medical History:   Diagnosis Date    Coronary artery disease     Gout, unspecified     Hypertension     Thyroid disease          Past Surgical History:   Procedure Laterality Date    CORONARY ANGIOPLASTY WITH STENT PLACEMENT         Time Tracking:     OT Date of Treatment: 04/22/25  OT Start Time: 0935  OT Stop Time: 0958  OT Total Time (min): 23 min    Billable  Minutes:Evaluation 1 unit  Self Care/Home Management 1 unit    4/22/2025

## 2025-04-22 NOTE — PROGRESS NOTES
[facility]        Chief Complaint: Inpatient Follow-up     HPI:   Heidy Gay is a 89 y.o. White female with a past medical history of hypertension, hyperlipidemia, coronary artery disease status post stent, hypothyroidism, gout. The patient presented to [facility] on 4/19/2025 following a trip resulting in a fal this morning (04/19/2025).  Patient lives at home alone and was found by her son lying on the floor.  She states she is going to the bathroom at 2:00 AM when she slipped landing on her back. Her head was under the bed. She is unsure if she hit her head.  For the last few days patient has been having to ambulate with a walker.  Son at bedside reports she has been having balance issues resulting in her using a walker.  Patient has had a decreased appetite recently.  Patient's family put her in a soft cervical collar due to complaints of neck pain since 04/15/2025 which son describes as a Crick in her neck.  Patient reports pain when looking laterally since neck pain onset.  Patient lives alone but has a son who frequently checks on her and brings her food.     Upon presentation to the ED, temperature 97.8° F, heart rate 55, blood pressure 116/40, respiratory rate 18 and SpO2 97% on room air.  Labs significant for WBC 14.2, H&H 10.6/33, MCV 88.5, , troponin less than 0.01.  EKG with undetermined rhythm, left axis deviation and left bundle-branch block.  CT of the head with no acute intracranial findings.  CT cervical spine with no acute fracture or malalignment.  X-ray of the pelvis with no acute findings.  Chest x-ray with no acute findings.  In ED patient received IV fluid hydration.  Son report he does not feel comfortable with patient going back home as she lives alone and is interested in placement.  Patient is admitted to hospital medicine services for further medical management.    4/21 pending placement to NH    Interval Hx:     AF.  SARAI. Wound care consulted for LLE shin chronic non healing wound.     Case was discussed with patient's nurse and  on the floor.    Objective/physical exam:  General: In no acute distress, afebrile, cachectic  Chest: Clear to auscultation bilaterally  Heart: RRR, +S1, S2, no appreciable murmur  Abdomen: Soft, nontender, BS +  MSK: Warm, no lower extremity edema, no clubbing or cyanosis  Skin tear to left elbow and left lower extremity which is wrapped. Ecchymosis to left finger and elbow.   Neurologic: Alert and oriented, moving all extremities with good strength    VITAL SIGNS: 24 HRS MIN & MAX LAST   Temp  Min: 98.2 °F (36.8 °C)  Max: 98.6 °F (37 °C) 98.4 °F (36.9 °C)   BP  Min: 117/65  Max: 132/70 117/65   Pulse  Min: 92  Max: 105  94   Resp  Min: 16  Max: 18 18   SpO2  Min: 95 %  Max: 96 % 96 %     I have reviewed the following labs:  Recent Labs   Lab 04/19/25  1428 04/20/25  0415   WBC 14.20* 6.94   RBC 3.73* 3.35*   HGB 10.6* 9.2*   HCT 33.0* 29.6*   MCV 88.5 88.4   MCH 28.4 27.5   MCHC 32.1* 31.1*   RDW 14.7 14.9    322   MPV 10.3 9.3     Recent Labs   Lab 04/19/25  1428 04/20/25  0415    144   K 4.5 3.4*    111   CO2 24 26   BUN 20.6* 11.5   CREATININE 0.69 0.52*   CALCIUM 10.3* 9.1   ALBUMIN 3.6 2.8*   ALKPHOS 76 63   ALT 10 10   AST 19 19   BILITOT 0.6 0.5     Microbiology Results (last 7 days)       Procedure Component Value Units Date/Time    Blood Culture [[id number]]  (Normal) Collected: 04/19/25 1751    Order Status: Completed Specimen: Blood, Venous Updated: 04/21/25 1901     Blood Culture No Growth At 48 Hours    Blood Culture [[id number]]  (Normal) Collected: 04/19/25 1752    Order Status: Completed Specimen: Blood, Venous Updated: 04/21/25 1901     Blood Culture No Growth At 48 Hours             See below for Radiology    Assessment/Plan:    Fall, wanting nursing home placement  Normocytic anemia, stable   Leukocytosis - resolved  History of hypertension,  hyperlipidemia, coronary artery disease status post stent, hypothyroidism, gout        Plan -  working on placement. Moderate intensity therapy by PT. Pt has no complains . Alert /active and oriented .     VTE prophylaxis:      Patient condition:  Stable     Anticipated discharge and Disposition:   tbd      All diagnosis and differential diagnosis have been reviewed; assessment and plan has been documented; I have personally reviewed the labs and test results that are presently available; I have reviewed the patients medication list; I have reviewed the consulting providers response and recommendations. I have reviewed or attempted to review medical records based upon their availability    All of the patient's questions have been  addressed and answered. Patient's is agreeable to the above stated plan. I will continue to monitor closely and make adjustments to medical management as needed.    Portions of this note dictated using EMR integrated voice recognition software, and may be subject to voice recognition errors not corrected at proofreading. Please contact writer for clarification if needed.   _____________________________________________________________________    Malnutrition Status:  Nutrition consulted. Most recent weight and BMI monitored-     Measurements:  Wt Readings from Last 1 Encounters:   04/20/25 49.9 kg (110 lb 0.2 oz)   Body mass index is 18.31 kg/m².    Patient has been screened and assessed by RD.    Malnutrition Type:  Context:    Level:      Malnutrition Characteristic Summary:       Interventions/Recommendations (treatment strategy):        Scheduled Med:   allopurinoL  100 mg Oral Daily    enoxparin  40 mg Subcutaneous Daily    levothyroxine  25 mcg Oral Before breakfast    tamsulosin  0.4 mg Oral QHS      Continuous Infusions:     PRN Meds:    Current Facility-Administered Medications:     acetaminophen, 650 mg, Oral, Q8H PRN    acetaminophen, 650 mg, Oral, Q4H PRN    dextrose  50%, 12.5 g, Intravenous, PRN    dextrose 50%, 25 g, Intravenous, PRN    glucagon (human recombinant), 1 mg, Intramuscular, PRN    glucose, 16 g, Oral, PRN    glucose, 24 g, Oral, PRN    ondansetron, 4 mg, Intravenous, Q4H PRN    sodium chloride 0.9%, 10 mL, Intravenous, Q12H PRN     Radiology:  I have personally reviewed the following imaging and agree with the radiologist.     X-Ray Pelvis Routine AP  Narrative: EXAMINATION:  XR PELVIS ROUTINE AP    CLINICAL HISTORY:  Pain, unspecifiedfall.    COMPARISON:  28 April 2021    FINDINGS:  Frontal image of the pelvis.  There is no acute fracture or dislocation.  There is right hip arthroplasty.  Severe degenerative changes of the left hip.  Impression: No acute findings.    Electronically signed by: Jarret Nelson  Date:    04/19/2025  Time:    16:23  X-Ray Chest AP Portable  Narrative: EXAMINATION:  XR CHEST AP PORTABLE    CLINICAL HISTORY:  Unspecified fall, initial encounter    COMPARISON:  28 April 2021    FINDINGS:  Frontal view of the chest was obtained. Heart is not significantly enlarged.  There is aortic atherosclerosis.  Grossly clear lungs.  No pneumothorax.  Impression: No acute findings.    Electronically signed by: Jarret Nelson  Date:    04/19/2025  Time:    16:14  CT Cervical Spine Without Contrast  Narrative: EXAMINATION:  CT CERVICAL SPINE WITHOUT CONTRAST    CLINICAL HISTORY:  Trip and fall    TECHNIQUE:  Multidetector axial images were performed of the cervical spine without and.  Images were reconstructed.    Automated exposure control was utilized to minimize radiation dose.  DLP 1159 mGy cm..    COMPARISON:  None available.    FINDINGS:  Cervical vertebrae stature is maintained.  There is grade 1 anterolisthesis of C3 on C4.  No acute fracture or malalignment identified.  There are degenerative changes with ventral osteophyte ridging indenting the ventral thecal sac at C5-C6 and at C6-C7.  There is moderate right and mild narrowing of the left neural  foramen at C7-T1.  There is no prevertebral soft tissue prominence.    This study does not exclude the possibility of intrathecal soft tissue, ligamentous or vascular injury.  Impression: No acute fracture or malalignment identified.    Electronically signed by: Shon Fletcher  Date:    04/19/2025  Time:    12:27  CT Head Without Contrast  Narrative: EXAMINATION:  CT HEAD WITHOUT CONTRAST    CLINICAL HISTORY:  Fall.  Abrasion    TECHNIQUE:  Sequential axial images were performed of the brain without contrast.    Dose product length of 1159 mGycm. Automated exposure control was utilized to minimize radiation dose.    COMPARISON:  October 3, 2022.    FINDINGS:  There is no intracranial mass effect, midline shift, hydrocephalus or hemorrhage. There is no sulcal effacement or low attenuation changes to suggest recent large vessel territory infarction. Chronic appearing periventricular and subcortical white matter low attenuation changes are present and are consistent with chronic microangiopathic ischemia. The ventricular system and sulcal markings prominence is consistent with atrophy. There is no acute extra axial fluid collection.  There is no acute depressed skull fracture visualized paranasal sinuses are clear without mucosal thickening, polypoidal abnormality or air-fluid levels. Mastoid air cells aeration is optimal.  Impression: No acute intracranial findings identified.    Electronically signed by: Shon Fletcher  Date:    04/19/2025  Time:    12:21      Donovan Edwards MD  Department of Hospital Medicine   [facility]   04/22/2025

## 2025-04-22 NOTE — PT/OT/SLP PROGRESS
"Physical Therapy Treatment    Patient Name:  Heidy Gay   MRN:  [id number]    Recommendations:     Discharge therapy intensity: Moderate Intensity Therapy   Discharge Equipment Recommendations: to be determined by next level of care  Barriers to discharge: Impaired mobility and Ongoing medical needs    Assessment:     Heidy Gay is a 89 y.o. female admitted with a medical diagnosis of recent fall and increased difficulty walking .  She presents with the following impairments/functional limitations: impaired endurance, impaired functional mobility, gait instability, impaired self care skills, impaired balance, decreased safety awareness.    Rehab Prognosis: Good; patient would benefit from acute skilled PT services to address these deficits and reach maximum level of function.    Recent Surgery: * No surgery found *      Plan:     During this hospitalization, patient would benefit from acute PT services 5 x/week to address the identified rehab impairments via gait training, therapeutic activities, therapeutic exercises and progress toward the following goals:    Plan of Care Expires:  05/09/25    Subjective     Chief Complaint: none stated  Patient/Family Comments/goals: "thank you ladies"  Pain/Comfort:  Pain Rating 1: 0/10      Objective:     Communicated with nurse prior to session.  Patient found HOB elevated with PureWick upon PT entry to room.     General Precautions: Standard, fall  Orthopedic Precautions: N/A  Braces: N/A  Respiratory Status: Room air  Blood Pressure: nt  Skin Integrity: Visible skin intact      Functional Mobility:  Bed Mobility:     Scooting: minimum assistance  Supine to Sit: minimum assistance  Sit to Supine: minimum assistance  Transfers:     Sit to Stand:  minimum assistance with rolling walker  Toilet t/f: CGA with rolling walker   Gait: patient amb 18ftx2 with rolling walker with CGA.       Education:  Patient provided with verbal education education regarding PT " role/goals/POC, post-op precautions, fall prevention, and safety awareness.  Understanding was verbalized.     Patient left HOB elevated with all lines intact and call button in reach    GOALS:   Multidisciplinary Problems       Physical Therapy Goals          Problem: Physical Therapy    Goal Priority Disciplines Outcome Interventions   Physical Therapy Goal     PT, PT/OT Progressing    Description: Pt will be seen for the following goals  1. Pt will be ind with bed mobility  2. Pt will be mod ind with transfers with rw  3. Pt will be mod ind with gait with a rw 200ft                       Time Tracking:     PT Received On: 04/22/25  PT Start Time: 0935     PT Stop Time: 0958  PT Total Time (min): 23 min     Billable Minutes: Gait Training 23    Treatment Type: Treatment  PT/PTA: PTA     Number of PTA visits since last PT visit: 1 04/22/2025

## 2025-04-23 PROCEDURE — 97110 THERAPEUTIC EXERCISES: CPT

## 2025-04-23 PROCEDURE — 25000003 PHARM REV CODE 250: Performed by: INTERNAL MEDICINE

## 2025-04-23 PROCEDURE — 25000003 PHARM REV CODE 250: Performed by: PHYSICIAN ASSISTANT

## 2025-04-23 PROCEDURE — 97530 THERAPEUTIC ACTIVITIES: CPT

## 2025-04-23 PROCEDURE — 11000001 HC ACUTE MED/SURG PRIVATE ROOM

## 2025-04-23 PROCEDURE — 63600175 PHARM REV CODE 636 W HCPCS: Performed by: PHYSICIAN ASSISTANT

## 2025-04-23 RX ORDER — METHENAMINE HIPPURATE 1000 MG/1
1 TABLET ORAL 2 TIMES DAILY
Status: DISCONTINUED | OUTPATIENT
Start: 2025-04-23 | End: 2025-04-24 | Stop reason: HOSPADM

## 2025-04-23 RX ORDER — METHENAMINE HIPPURATE 1000 MG/1
1 TABLET ORAL 2 TIMES DAILY
Status: DISCONTINUED | OUTPATIENT
Start: 2025-04-23 | End: 2025-04-23

## 2025-04-23 RX ADMIN — ACETAMINOPHEN 650 MG: 325 TABLET ORAL at 08:04

## 2025-04-23 RX ADMIN — METHENAMINE HIPPURATE 1 G: 1 TABLET ORAL at 09:04

## 2025-04-23 RX ADMIN — LEVOTHYROXINE SODIUM 25 MCG: 0.03 TABLET ORAL at 05:04

## 2025-04-23 RX ADMIN — METHENAMINE HIPPURATE 1 G: 1 TABLET ORAL at 02:04

## 2025-04-23 RX ADMIN — ALLOPURINOL 100 MG: 100 TABLET ORAL at 08:04

## 2025-04-23 RX ADMIN — ENOXAPARIN SODIUM 40 MG: 40 INJECTION SUBCUTANEOUS at 05:04

## 2025-04-23 RX ADMIN — TAMSULOSIN HYDROCHLORIDE 0.4 MG: 0.4 CAPSULE ORAL at 09:04

## 2025-04-23 NOTE — PLAN OF CARE
Sent clincial updates to [facility], [facility], and [facility]. Will follow up following the facility's morning meetings.    0834: Reached out to Stephani at [facility] to confirm that she was able to come assess this patient yesterday afternoon. She stated that she was and she has a meeting with the patient's son scheduled for noon today. She will update me following that meeting. They expect to be able to be able to admit this patient tomorrow.     1401: Reached out to Stephani at [facility] to see how the family meeting went today. Awaiting response at this time.     1524: Received text from Stephani that she was just leaving the family meeting and she was on her way to discuss this patient's acceptance with her DON.

## 2025-04-23 NOTE — PT/OT/SLP PROGRESS
Occupational Therapy   Treatment    Name: Heidy Gay  MRN: [id number]  Admitting Diagnosis:  <principal problem not specified>       Recommendations:     Recommended therapy intensity at discharge: Moderate Intensity Therapy   Discharge Equipment Recommendations:   (tbd)  Barriers to discharge:       Assessment:     Heidy Gay is a 89 y.o. female with a medical diagnosis of fall, dehydration.  She presents very tired today, increased assist needed with mobility and activity. Performance deficits affecting function are weakness, impaired endurance, impaired self care skills, impaired functional mobility, decreased safety awareness.     Rehab Prognosis:  fair; patient would benefit from acute skilled OT services to address these deficits and reach maximum level of function.       Plan:     Patient to be seen 4 x/week to address the above listed problems via self-care/home management, therapeutic activities, therapeutic exercises  Plan of Care Expires: 05/20/25  Plan of Care Reviewed with: patient    Subjective     Pain/Comfort:  Pain Rating 1: 0/10    Objective:     Communicated with: nsg prior to session.  Patient found supine with PureWick upon OT entry to room.    General Precautions: Standard, fall    Orthopedic Precautions:   Braces:    Respiratory Status:   Vital Signs:      Occupational Performance:     Bed Mobility:    Sup to sit with mod assist, cues, increased time and encouragement     Functional Mobility/Transfers:  Sit to stand with mod assist, poor tolerance standing and returned to sitting quickly  Functional Mobility: scooting along EOB with mod assist    Activities of Daily Living:      Therapeutic Activities:  As above     Therapeutic Exercise:  BLE AROM x 2 sets x 10 reps, increased cues and encouragement, practiced trunk extensions and postural activities.     Patient Education:  Patient provided with verbal education education regarding OT role/goals/POC.  Additional teaching is  warranted.      Patient left right sidelying with wedge under L side.    GOALS:   Multidisciplinary Problems       Occupational Therapy Goals          Problem: Occupational Therapy    Goal Priority Disciplines Outcome Interventions   Occupational Therapy Goal     OT, PT/OT Progressing    Description: Goals to be met by: 5/20/25     Patient will increase functional independence with ADLs by performing:    UE Dressing with Stand-by Assistance.  LE Dressing with Stand-by Assistance.  Grooming while standing with Stand-by Assistance.  Toileting from toilet with Stand-by Assistance for hygiene and clothing management.   Toilet transfer to toilet with Stand-by Assistance.                         Time Tracking:     OT Date of Treatment: 04/23/25  OT Start Time: 1455  OT Stop Time: 1518  OT Total Time (min): 23 min    Billable Minutes:Therapeutic Activity 1 unit  Therapeutic Exercise 1 unit    OT/ROD: OT     Number of ROD visits since last OT visit: 1    4/23/2025

## 2025-04-23 NOTE — PLAN OF CARE
Problem: Adult Inpatient Plan of Care  Goal: Plan of Care Review  Outcome: Progressing  Goal: Patient-Specific Goal (Individualized)  Outcome: Progressing  Goal: Absence of Hospital-Acquired Illness or Injury  Outcome: Progressing  Goal: Optimal Comfort and Wellbeing  Outcome: Progressing  Goal: Readiness for Transition of Care  Outcome: Progressing     Problem: Skin Injury Risk Increased  Goal: Skin Health and Integrity  Outcome: Progressing     Problem: Fall Injury Risk  Goal: Absence of Fall and Fall-Related Injury  Outcome: Progressing     Problem: Wound  Goal: Optimal Coping  Outcome: Progressing  Goal: Optimal Functional Ability  Outcome: Progressing  Goal: Absence of Infection Signs and Symptoms  Outcome: Progressing  Goal: Improved Oral Intake  Outcome: Progressing  Goal: Optimal Pain Control and Function  Outcome: Progressing  Goal: Skin Health and Integrity  Outcome: Progressing  Goal: Optimal Wound Healing  Outcome: Progressing     Problem: Pain Acute  Goal: Optimal Pain Control and Function  Outcome: Progressing

## 2025-04-23 NOTE — PT/OT/SLP PROGRESS
Physical Therapy      Patient Name:  Heidy Gay   MRN:  [id number]    Patient not seen today secondary to patient politely declined 2/2 just working with occupational therapist and requesting to rest. Patient reports she does better in morning  . Will follow-up as schedule allows.

## 2025-04-23 NOTE — PROGRESS NOTES
[facility] Hospital Medicine Progress Note        Chief Complaint: Inpatient Follow-up     HPI:   Heidy Gay is a 89 y.o. White female with a past medical history of hypertension, hyperlipidemia, coronary artery disease status post stent, hypothyroidism, gout. The patient presented to [facility] on 4/19/2025 following a trip resulting in a fal this morning (04/19/2025).  Patient lives at home alone and was found by her son lying on the floor.  She states she is going to the bathroom at 2:00 AM when she slipped landing on her back. Her head was under the bed. She is unsure if she hit her head.  For the last few days patient has been having to ambulate with a walker.  Son at bedside reports she has been having balance issues resulting in her using a walker.  Patient has had a decreased appetite recently.  Patient's family put her in a soft cervical collar due to complaints of neck pain since 04/15/2025 which son describes as a Crick in her neck.  Patient reports pain when looking laterally since neck pain onset.  Patient lives alone but has a son who frequently checks on her and brings her food.     Upon presentation to the ED, temperature 97.8° F, heart rate 55, blood pressure 116/40, respiratory rate 18 and SpO2 97% on room air.  Labs significant for WBC 14.2, H&H 10.6/33, MCV 88.5, , troponin less than 0.01.  EKG with undetermined rhythm, left axis deviation and left bundle-branch block.  CT of the head with no acute intracranial findings.  CT cervical spine with no acute fracture or malalignment.  X-ray of the pelvis with no acute findings.  Chest x-ray with no acute findings.  In ED patient received IV fluid hydration.  Son report he does not feel comfortable with patient going back home as she lives alone and is interested in placement.  Patient is admitted to hospital medicine services for further medical management.    4/21 pending placement to NH  4/22 Wound care  consulted for LLE shin chronic non healing wound.     Interval Hx:     AF. SARAI. Planned for NH placement tentatively in am.    Case was discussed with patient's nurse and  on the floor.    Objective/physical exam:  General: In no acute distress, afebrile, cachectic  Chest: Clear to auscultation bilaterally  Heart: RRR, +S1, S2, no appreciable murmur  Abdomen: Soft, nontender, BS +  MSK: Warm, no lower extremity edema, no clubbing or cyanosis  Skin tear to left elbow and left lower extremity which is wrapped. Ecchymosis to left finger and elbow.   Neurologic: Alert and oriented, moving all extremities with good strength    VITAL SIGNS: 24 HRS MIN & MAX LAST   Temp  Min: 98.1 °F (36.7 °C)  Max: 98.7 °F (37.1 °C) 98.1 °F (36.7 °C)   BP  Min: 106/59  Max: 124/65 (!) 118/56   Pulse  Min: 83  Max: 101  91   Resp  Min: 18  Max: 18 18   SpO2  Min: 92 %  Max: 96 % 95 %     I have reviewed the following labs:  Recent Labs   Lab 04/19/25  1428 04/20/25  0415   WBC 14.20* 6.94   RBC 3.73* 3.35*   HGB 10.6* 9.2*   HCT 33.0* 29.6*   MCV 88.5 88.4   MCH 28.4 27.5   MCHC 32.1* 31.1*   RDW 14.7 14.9    322   MPV 10.3 9.3     Recent Labs   Lab 04/19/25  1428 04/20/25  0415    144   K 4.5 3.4*    111   CO2 24 26   BUN 20.6* 11.5   CREATININE 0.69 0.52*   CALCIUM 10.3* 9.1   ALBUMIN 3.6 2.8*   ALKPHOS 76 63   ALT 10 10   AST 19 19   BILITOT 0.6 0.5     Microbiology Results (last 7 days)       Procedure Component Value Units Date/Time    Blood Culture [[id number]]  (Normal) Collected: 04/19/25 1751    Order Status: Completed Specimen: Blood, Venous Updated: 04/22/25 1901     Blood Culture No Growth At 72 Hours    Blood Culture [[id number]]  (Normal) Collected: 04/19/25 1752    Order Status: Completed Specimen: Blood, Venous Updated: 04/22/25 1901     Blood Culture No Growth At 72 Hours             See below for Radiology    Assessment/Plan:    Fall, wanting nursing home placement  Normocytic anemia,  stable   Leukocytosis - resolved  History of hypertension, hyperlipidemia, coronary artery disease status post stent, hypothyroidism, gout        Plan -  working on placement. Moderate intensity therapy by PT. Pt has no complains . Alert /active and oriented .     VTE prophylaxis:      Patient condition:  Stable     Anticipated discharge and Disposition:   tbd      All diagnosis and differential diagnosis have been reviewed; assessment and plan has been documented; I have personally reviewed the labs and test results that are presently available; I have reviewed the patients medication list; I have reviewed the consulting providers response and recommendations. I have reviewed or attempted to review medical records based upon their availability    All of the patient's questions have been  addressed and answered. Patient's is agreeable to the above stated plan. I will continue to monitor closely and make adjustments to medical management as needed.    Portions of this note dictated using EMR integrated voice recognition software, and may be subject to voice recognition errors not corrected at proofreading. Please contact writer for clarification if needed.   _____________________________________________________________________    Malnutrition Status:  Nutrition consulted. Most recent weight and BMI monitored-     Measurements:  Wt Readings from Last 1 Encounters:   04/20/25 49.9 kg (110 lb 0.2 oz)   Body mass index is 18.31 kg/m².    Patient has been screened and assessed by RD.    Malnutrition Type:  Context:    Level:      Malnutrition Characteristic Summary:       Interventions/Recommendations (treatment strategy):        Scheduled Med:   allopurinoL  100 mg Oral Daily    enoxparin  40 mg Subcutaneous Daily    levothyroxine  25 mcg Oral Before breakfast    methenamine  1 g Oral BID    tamsulosin  0.4 mg Oral QHS      Continuous Infusions:     PRN Meds:    Current Facility-Administered Medications:      acetaminophen, 650 mg, Oral, Q8H PRN    acetaminophen, 650 mg, Oral, Q4H PRN    dextrose 50%, 12.5 g, Intravenous, PRN    dextrose 50%, 25 g, Intravenous, PRN    glucagon (human recombinant), 1 mg, Intramuscular, PRN    glucose, 16 g, Oral, PRN    glucose, 24 g, Oral, PRN    ondansetron, 4 mg, Intravenous, Q4H PRN    sodium chloride 0.9%, 10 mL, Intravenous, Q12H PRN     Radiology:  I have personally reviewed the following imaging and agree with the radiologist.     X-Ray Pelvis Routine AP  Narrative: EXAMINATION:  XR PELVIS ROUTINE AP    CLINICAL HISTORY:  Pain, unspecifiedfall.    COMPARISON:  28 April 2021    FINDINGS:  Frontal image of the pelvis.  There is no acute fracture or dislocation.  There is right hip arthroplasty.  Severe degenerative changes of the left hip.  Impression: No acute findings.    Electronically signed by: Jarret Nelson  Date:    04/19/2025  Time:    16:23  X-Ray Chest AP Portable  Narrative: EXAMINATION:  XR CHEST AP PORTABLE    CLINICAL HISTORY:  Unspecified fall, initial encounter    COMPARISON:  28 April 2021    FINDINGS:  Frontal view of the chest was obtained. Heart is not significantly enlarged.  There is aortic atherosclerosis.  Grossly clear lungs.  No pneumothorax.  Impression: No acute findings.    Electronically signed by: Jarret Nelson  Date:    04/19/2025  Time:    16:14  CT Cervical Spine Without Contrast  Narrative: EXAMINATION:  CT CERVICAL SPINE WITHOUT CONTRAST    CLINICAL HISTORY:  Trip and fall    TECHNIQUE:  Multidetector axial images were performed of the cervical spine without and.  Images were reconstructed.    Automated exposure control was utilized to minimize radiation dose.  DLP 1159 mGy cm..    COMPARISON:  None available.    FINDINGS:  Cervical vertebrae stature is maintained.  There is grade 1 anterolisthesis of C3 on C4.  No acute fracture or malalignment identified.  There are degenerative changes with ventral osteophyte ridging indenting the ventral thecal  sac at C5-C6 and at C6-C7.  There is moderate right and mild narrowing of the left neural foramen at C7-T1.  There is no prevertebral soft tissue prominence.    This study does not exclude the possibility of intrathecal soft tissue, ligamentous or vascular injury.  Impression: No acute fracture or malalignment identified.    Electronically signed by: Shon Fletcher  Date:    04/19/2025  Time:    12:27  CT Head Without Contrast  Narrative: EXAMINATION:  CT HEAD WITHOUT CONTRAST    CLINICAL HISTORY:  Fall.  Abrasion    TECHNIQUE:  Sequential axial images were performed of the brain without contrast.    Dose product length of 1159 mGycm. Automated exposure control was utilized to minimize radiation dose.    COMPARISON:  October 3, 2022.    FINDINGS:  There is no intracranial mass effect, midline shift, hydrocephalus or hemorrhage. There is no sulcal effacement or low attenuation changes to suggest recent large vessel territory infarction. Chronic appearing periventricular and subcortical white matter low attenuation changes are present and are consistent with chronic microangiopathic ischemia. The ventricular system and sulcal markings prominence is consistent with atrophy. There is no acute extra axial fluid collection.  There is no acute depressed skull fracture visualized paranasal sinuses are clear without mucosal thickening, polypoidal abnormality or air-fluid levels. Mastoid air cells aeration is optimal.  Impression: No acute intracranial findings identified.    Electronically signed by: Shon Fletcher  Date:    04/19/2025  Time:    12:21      Donovan Edwards MD  Department of Hospital Medicine   [facility]   04/23/2025

## 2025-04-24 VITALS
HEART RATE: 90 BPM | HEIGHT: 65 IN | SYSTOLIC BLOOD PRESSURE: 116 MMHG | OXYGEN SATURATION: 95 % | WEIGHT: 110 LBS | BODY MASS INDEX: 18.33 KG/M2 | DIASTOLIC BLOOD PRESSURE: 69 MMHG | TEMPERATURE: 98 F | RESPIRATION RATE: 22 BRPM

## 2025-04-24 PROBLEM — E44.0 MODERATE MALNUTRITION: Status: ACTIVE | Noted: 2025-04-24

## 2025-04-24 LAB
ANION GAP SERPL CALC-SCNC: 7 MEQ/L
BACTERIA BLD CULT: NORMAL
BACTERIA BLD CULT: NORMAL
BUN SERPL-MCNC: 13.4 MG/DL (ref 9.8–20.1)
CALCIUM SERPL-MCNC: 8.7 MG/DL (ref 8.4–10.2)
CHLORIDE SERPL-SCNC: 111 MMOL/L (ref 98–111)
CO2 SERPL-SCNC: 22 MMOL/L (ref 23–31)
CREAT SERPL-MCNC: 0.57 MG/DL (ref 0.55–1.02)
CREAT/UREA NIT SERPL: 24
GFR SERPLBLD CREATININE-BSD FMLA CKD-EPI: >60 ML/MIN/1.73/M2
GLUCOSE SERPL-MCNC: 96 MG/DL (ref 75–121)
MAGNESIUM SERPL-MCNC: 1.6 MG/DL (ref 1.6–2.6)
POTASSIUM SERPL-SCNC: 3.7 MMOL/L (ref 3.5–5.1)
SODIUM SERPL-SCNC: 140 MMOL/L (ref 136–145)

## 2025-04-24 PROCEDURE — 80048 BASIC METABOLIC PNL TOTAL CA: CPT | Performed by: INTERNAL MEDICINE

## 2025-04-24 PROCEDURE — 97530 THERAPEUTIC ACTIVITIES: CPT | Mod: CQ

## 2025-04-24 PROCEDURE — 83735 ASSAY OF MAGNESIUM: CPT | Performed by: INTERNAL MEDICINE

## 2025-04-24 PROCEDURE — 36415 COLL VENOUS BLD VENIPUNCTURE: CPT | Performed by: INTERNAL MEDICINE

## 2025-04-24 PROCEDURE — 25000003 PHARM REV CODE 250: Performed by: PHYSICIAN ASSISTANT

## 2025-04-24 PROCEDURE — 25000003 PHARM REV CODE 250: Performed by: INTERNAL MEDICINE

## 2025-04-24 RX ADMIN — LEVOTHYROXINE SODIUM 25 MCG: 0.03 TABLET ORAL at 06:04

## 2025-04-24 RX ADMIN — ALLOPURINOL 100 MG: 100 TABLET ORAL at 08:04

## 2025-04-24 RX ADMIN — METHENAMINE HIPPURATE 1 G: 1 TABLET ORAL at 08:04

## 2025-04-24 RX ADMIN — ACETAMINOPHEN 650 MG: 325 TABLET ORAL at 08:04

## 2025-04-24 NOTE — PROGRESS NOTES
Inpatient Nutrition Assessment    Admit Date: 4/19/2025   Total duration of encounter: 5 days   Patient Age: 89 y.o.    Nutrition Recommendation/Prescription     Continue heart healthy diet as tolerated  Vanilla Boost Plus BID (360 kcal and 14 gm protein per serving)  Monitor PO intake, labs and weight    Communication of Recommendations: reviewed with family    Nutrition Assessment     Malnutrition Assessment/Nutrition-Focused Physical Exam    Malnutrition Context: chronic illness (04/24/25 1334)  Malnutrition Level: moderate (04/24/25 1334)  Energy Intake (Malnutrition): less than 75% for greater than or equal to 3 months (04/24/25 1334)  Weight Loss (Malnutrition): other (see comments) (Does not meet criteria) (04/24/25 1334)              Muscle Mass (Malnutrition): moderate depletion (04/24/25 1334)  Restorationist Region (Muscle Loss): moderate depletion                       Fluid Accumulation (Malnutrition): other (see comments) (Unable to assess) (04/24/25 1334)        A minimum of two characteristics is recommended for diagnosis of either severe or non-severe malnutrition.    Chart Review    Reason Seen: length of stay    Malnutrition Screening Tool Results   Have you recently lost weight without trying?: No  Have you been eating poorly because of a decreased appetite?: No   MST Score: 0   Diagnosis:  Fall, wanting nursing home placement  Normocytic anemia, stable   Leukocytosis - resolved    Relevant Medical History: HTN, HLD, CAD s/p stent, hypothyroidism, gout    Scheduled Medications:  allopurinoL, 100 mg, Daily  enoxparin, 40 mg, Daily  levothyroxine, 25 mcg, Before breakfast  methenamine, 1 g, BID  tamsulosin, 0.4 mg, QHS    Continuous Infusions:   PRN Medications:  acetaminophen, 650 mg, Q8H PRN  acetaminophen, 650 mg, Q4H PRN  dextrose 50%, 12.5 g, PRN  dextrose 50%, 25 g, PRN  glucagon (human recombinant), 1 mg, PRN  glucose, 16 g, PRN  glucose, 24 g, PRN  ondansetron, 4 mg, Q4H PRN  sodium chloride 0.9%,  "10 mL, Q12H PRN    Calorie Containing IV Medications: no significant kcals from medications at this time    Recent Labs   Lab 25  1428 25  0415 25  0435    144 140   K 4.5 3.4* 3.7   CALCIUM 10.3* 9.1 8.7   MG  --   --  1.60    111 111   CO2 24 26 22*   BUN 20.6* 11.5 13.4   CREATININE 0.69 0.52* 0.57   EGFRNORACEVR >60 >60 >60   GLUCOSE 118 75 96   BILITOT 0.6 0.5  --    ALKPHOS 76 63  --    ALT 10 10  --    AST 19 19  --    ALBUMIN 3.6 2.8*  --    WBC 14.20* 6.94  --    HGB 10.6* 9.2*  --    HCT 33.0* 29.6*  --      Nutrition Orders:  Diet Heart Healthy Standard Tray  Diet Adult Regular  Dietary nutrition supplements BID; Boost Plus Nutritional Drink - Very Vanilla    Appetite/Oral Intake: fair/50-75% of meals  Factors Affecting Nutritional Intake: decreased appetite  Social Needs Impacting Access to Food: none identified  Food/[employer]/Cultural Preferences: none reported  Food Allergies: no known food allergies  Last Bowel Movement: 25  Wound(s):     Wound 25 1500 Skin Tear Left anterior Leg-Tissue loss description: Partial thickness     Comments    : Pt asleep at time of rounds. Family reports decreased appetite/PO intake past few months. Drinks Ensure at home, agreed to continue BID inpatient. Denies n/v; LBM  documented. Family reports few # gradual unintentional weight loss. Weight appears stable per EMR weight history review. Pt with observed muscle depletion.    Anthropometrics    Height: 5' 5" (165.1 cm), Height Method: Stated  Last Weight: 49.9 kg (110 lb 0.2 oz) (25 0606), Weight Method: Standard Scale  BMI (Calculated): 18.3  BMI Classification: underweight (BMI less than 18.5)     Ideal Body Weight (IBW), Female: 125 lb     % Ideal Body Weight, Female (lb): 88.01 %                    Usual Body Weight (UBW), k.8 kg  % Usual Body Weight: 102.47     Usual Weight Provided By: family/caregiver and EMR weight history    Wt Readings from Last 5 " Encounters:   04/20/25 49.9 kg (110 lb 0.2 oz)   10/19/22 48.8 kg (107 lb 9.4 oz)   10/03/22 49.9 kg (110 lb)   01/11/21 51.6 kg (113 lb 12.1 oz)     Weight Change(s) Since Admission:   Wt Readings from Last 1 Encounters:   04/20/25 0606 49.9 kg (110 lb 0.2 oz)   04/19/25 1033 49.9 kg (110 lb)   Admit Weight: 49.9 kg (110 lb) (04/19/25 1033), Weight Method: Standard Scale    Estimated Needs    Weight Used For Calorie Calculations: 49.9 kg (110 lb 0.2 oz)  Energy Calorie Requirements (kcal): 1248 kcal (25 kcal/kg)  Energy Need Method: [place]-St Jeor  Weight Used For Protein Calculations: 49.9 kg (110 lb 0.2 oz)  Protein Requirements: 50-55 gm (1.0-1.1 gm/kg)  Fluid Requirements (mL): 1248 mL (25 mL/kg)        Enteral Nutrition     Patient not receiving enteral nutrition at this time.    Parenteral Nutrition     Patient not receiving parenteral nutrition support at this time.    Evaluation of Received Nutrient Intake    Calories: not meeting estimated needs  Protein: not meeting estimated needs    Patient Education     Not applicable.    Nutrition Diagnosis     PES: Inadequate oral intake related to chronic illness as evidenced by <75% PO intake. (new)     PES: Moderate chronic disease or condition related malnutrition Related to chronic illness As Evidenced by:  - energy intake: < 75% for 3 months (meets criteria for < 75% >= 1 month (moderate - chronic)) - muscle mass depletion: 1 area of moderate muscle loss (Temporalis) new    Nutrition Interventions     Intervention(s): general/healthful diet, commercial beverage, and collaboration with other providers  Intervention(s): Oral diet/nutrient modifications;Oral nutritional supplement    Goal: Consume % of oral supplements by follow-up. (new)  Goal: Maintain weight throughout hospitalization. (new)    Nutrition Goals & Monitoring     Dietitian will monitor: food and beverage intake, weight, and gastrointestinal profile  Discharge planning: continue heart  healthy diet with boost or similar oral supplements  Nutrition Risk/Follow-Up: high (follow-up in 1-4 days)   Please consult if re-assessment needed sooner.

## 2025-04-24 NOTE — PLAN OF CARE
SSC spoke with Stephani @ [facility], who states they are verifying pt's supplement benefits but most likely will be able to admit today. Awaiting a call back at this time.     1240- All DC information sent to [facility] via [employer]. Pt will transport via [facility]. DC packet given to nurse for report.

## 2025-04-24 NOTE — PT/OT/SLP PROGRESS
Physical Therapy Treatment    Patient Name:  Heidy Gay   MRN:  [id number]    Recommendations:     Discharge therapy intensity: Moderate Intensity Therapy   Discharge Equipment Recommendations: to be determined by next level of care  Barriers to discharge: Impaired mobility and Ongoing medical needs    Assessment:     Heidy Gay is a 89 y.o. female admitted with a medical diagnosis of recent fall and increased difficulty walking .  She presents with the following impairments/functional limitations: impaired endurance, impaired functional mobility, gait instability, impaired self care skills, impaired balance, decreased safety awareness.    Increased time d/t increased pain    Rehab Prognosis: Good; patient would benefit from acute skilled PT services to address these deficits and reach maximum level of function.    Recent Surgery: * No surgery found *      Plan:     During this hospitalization, patient would benefit from acute PT services 5 x/week to address the identified rehab impairments via gait training, therapeutic activities, therapeutic exercises and progress toward the following goals:    Plan of Care Expires:  05/09/25    Subjective     Chief Complaint: pain in left lower extremity   Patient/Family Comments/goals: none stated  Pain/Comfort:  Pain Rating 1: other (see comments) (pain in L LE not rated)  Pain Addressed 1: Reposition, Distraction, Cessation of Activity, Nurse notified      Objective:     Communicated with nurse prior to session.  Patient found HOB elevated with PureWick upon PT entry to room.     General Precautions: Standard, fall  Orthopedic Precautions: N/A  Braces: N/A  Respiratory Status: Room air  Blood Pressure: nt  Skin Integrity: Visible skin intact      Functional Mobility:  Bed Mobility:     Scooting: minimum assistance  Supine to Sit: minimum assistance  Sit to Supine: minimum assistance  Transfers:     Sit to Stand:  attempted x4 however patient reporting too much pain  to stand      Education:  Patient provided with verbal education education regarding PT role/goals/POC, post-op precautions, fall prevention, and safety awareness.  Understanding was verbalized.     Patient left HOB elevated with all lines intact and call button in reach    GOALS:   Multidisciplinary Problems       Physical Therapy Goals          Problem: Physical Therapy    Goal Priority Disciplines Outcome Interventions   Physical Therapy Goal     PT, PT/OT Progressing    Description: Pt will be seen for the following goals  1. Pt will be ind with bed mobility  2. Pt will be mod ind with transfers with rw  3. Pt will be mod ind with gait with a rw 200ft                       Time Tracking:     PT Received On: 04/24/25  PT Start Time: 1006     PT Stop Time: 1038  PT Total Time (min): 32 min     Billable Minutes: Therapeutic Activity 32    Treatment Type: Treatment  PT/PTA: PTA     Number of PTA visits since last PT visit: 2     04/24/2025

## 2025-05-01 NOTE — DISCHARGE SUMMARY
"Hospital Medicine  Discharge Summary    Patient Name: Heidy Gay  MRN: [id number]  Admit Date: 4/19/2025  Discharge Date: 4/24/2025   Status: IP- Inpatient   Length of Stay: 5      PHYSICIANS   Admitting Physician: Vincenzo Hunter MD  Discharging Physician: Sergio Atkinson MD.  Primary Care Physician: Jay Arroyo MD  Consults: None      DISCHARGE DIAGNOSES   FalL  Normocytic anemia    h/o hypertension, hyperlipidemia, coronary artery disease status post stent, hypothyroidism, gout      PROCEDURES   None      HOSPITAL COURSE    "89 y.o. White female with a past medical history of hypertension, hyperlipidemia, coronary artery disease status post stent, hypothyroidism, gout. The patient presented to [facility] on 4/19/2025 following a trip resulting in a fal this morning (04/19/2025).  Patient lives at home alone and was found by her son lying on the floor.  She states she is going to the bathroom at 2:00 AM when she slipped landing on her back. Her head was under the bed. She is unsure if she hit her head.  For the last few days patient has been having to ambulate with a walker.  Son at bedside reports she has been having balance issues resulting in her using a walker.  Patient has had a decreased appetite recently.  Patient's family put her in a soft cervical collar due to complaints of neck pain since 04/15/2025 which son describes as a Crick in her neck.  Patient reports pain when looking laterally since neck pain onset.  Patient lives alone but has a son who frequently checks on her and brings her food.     Upon presentation to the ED, temperature 97.8° F, heart rate 55, blood pressure 116/40, respiratory rate 18 and SpO2 97% on room air.  Labs significant for WBC 14.2, H&H 10.6/33, MCV 88.5, , troponin less than 0.01.  EKG with undetermined rhythm, left axis deviation and left bundle-branch block.  CT of the head with no acute intracranial findings.  CT cervical spine with no acute fracture or " "malalignment.  X-ray of the pelvis with no acute findings.  Chest x-ray with no acute findings.  In ED patient received IV fluid hydration.  Son report he does not feel comfortable with patient going back home as she lives alone and is interested in placement.  Patient is admitted to hospital medicine services for further medical management.     4/21 pending placement to NH  4/22 Wound care consulted for LLE shin chronic non healing wound."    Assumed care on 4/24.  Patient accepted and stable for transfer to SNF.      STATUS  Improved    DISPOSITION  Discharge to SNF    DIET  Cardiac    ACTIVITY  As tolerated      FOLLOW-UP      Ethan Anderson MD. Go on 5/13/2025.    Specialty: Cardiology  Why: Tuesday, May 13th @2pm! :)  Contact information:  [address] [facility] [address] [phone]               PCP per NH Follow up.    Why: Physicians office will contact you with appointment date & time! :)               DISCHARGE MEDICATION RECONCILIATION     CONTINUE     allopurinoL 100 MG tablet  Commonly known as: ZYLOPRIM  Take 1 tablet (100 mg total) by mouth once daily.     levothyroxine 25 MCG tablet  Commonly known as: SYNTHROID  Take 1 tablet (25 mcg total) by mouth before breakfast.     methenamine 1 gram Tab  Commonly known as: HIPREX     pravastatin 40 MG tablet  Commonly known as: PRAVACHOL  Take 1 tablet (40 mg total) by mouth once daily.     tamsulosin 0.4 mg Cap  Commonly known as: FLOMAX  Take 1 capsule (0.4 mg total) by mouth every evening.       DISCONTINUE     amLODIPine 10 MG tablet  Commonly known as: NORVASC     meclizine 25 mg tablet  Commonly known as: ANTIVERT         PHYSICAL EXAM   VITALS: T 98.3 °F (36.8 °C)   /69   P 90   RR (!) 22   O2 95 %    GENERAL: awake and in no acute distress  LUNGS: Respirations non-labored, no peripheral cyanosis  CVS: Normal rate  ABD: Soft, non-tender  EXTREMITIES: Radial pulse 2+  NEURO: Awake,. alert  PSYCHIATRIC: " Cooperative        Discharge time: 33 minutes     Sergio Atkinson MD  [facility]